# Patient Record
Sex: FEMALE | Race: WHITE | NOT HISPANIC OR LATINO | Employment: FULL TIME | URBAN - METROPOLITAN AREA
[De-identification: names, ages, dates, MRNs, and addresses within clinical notes are randomized per-mention and may not be internally consistent; named-entity substitution may affect disease eponyms.]

---

## 2020-03-04 ENCOUNTER — TRANSCRIBE ORDERS (OUTPATIENT)
Dept: LAB | Facility: HOSPITAL | Age: 30
End: 2020-03-04

## 2020-03-04 ENCOUNTER — HOSPITAL ENCOUNTER (EMERGENCY)
Facility: HOSPITAL | Age: 30
Discharge: HOME | End: 2020-03-04
Attending: EMERGENCY MEDICINE | Admitting: EMERGENCY MEDICINE
Payer: COMMERCIAL

## 2020-03-04 VITALS
HEIGHT: 65 IN | SYSTOLIC BLOOD PRESSURE: 125 MMHG | TEMPERATURE: 98.1 F | BODY MASS INDEX: 31.32 KG/M2 | HEART RATE: 110 BPM | RESPIRATION RATE: 18 BRPM | DIASTOLIC BLOOD PRESSURE: 71 MMHG | WEIGHT: 188 LBS | OXYGEN SATURATION: 98 %

## 2020-03-04 DIAGNOSIS — O20.0 THREATENED MISCARRIAGE: Primary | ICD-10-CM

## 2020-03-04 LAB
ABO GROUP BLD: NORMAL
ABO GROUP BLD: NORMAL
RH BLD: POSITIVE
RH BLD: POSITIVE

## 2020-03-04 PROCEDURE — 99284 EMERGENCY DEPT VISIT MOD MDM: CPT | Performed by: EMERGENCY MEDICINE

## 2020-03-04 PROCEDURE — 86901 BLOOD TYPING SEROLOGIC RH(D): CPT | Performed by: EMERGENCY MEDICINE

## 2020-03-04 PROCEDURE — 36415 COLL VENOUS BLD VENIPUNCTURE: CPT | Performed by: EMERGENCY MEDICINE

## 2020-03-04 PROCEDURE — 86900 BLOOD TYPING SEROLOGIC ABO: CPT | Performed by: EMERGENCY MEDICINE

## 2020-03-04 PROCEDURE — 76815 OB US LIMITED FETUS(S): CPT | Performed by: EMERGENCY MEDICINE

## 2020-03-04 PROCEDURE — 99284 EMERGENCY DEPT VISIT MOD MDM: CPT

## 2020-03-04 NOTE — ED PROVIDER NOTES
History  Chief Complaint   Patient presents with    Pregnancy Problem     14 weeks pregnant  Has small amount of reddish discharge  this began this morning  Patient has "a little cramping on my right side"      Patient is a 49-year-old female  She is a  1 para 0  EGA of 14 weeks  She has had a prior ultrasound verifying an IUP  She presents to the emergency room with a 1 day history of spotting  No passage of products of conception  Small amount of pain on the right side of the lower abdomen  No fever or chills  No nausea or vomiting  Symptoms are moderate in intensity without aggravating or relieving factors  Prior to Admission Medications   Prescriptions Last Dose Informant Patient Reported? Taking? Prenatal MV-Min-Fe Fum-FA-DHA (PRENATAL 1 PO)   Yes Yes   Sig: Take by mouth   Progesterone Micronized (PROGESTERONE PO)   Yes Yes   Sig: Take 200 mg by mouth daily      Facility-Administered Medications: None       Past Medical History:   Diagnosis Date    Anxiety     Depression        Past Surgical History:   Procedure Laterality Date    KNEE SURGERY         History reviewed  No pertinent family history  I have reviewed and agree with the history as documented  E-Cigarette/Vaping    E-Cigarette Use Never User      E-Cigarette/Vaping Substances     Social History     Tobacco Use    Smoking status: Never Smoker    Smokeless tobacco: Never Used   Substance Use Topics    Alcohol use: Not Currently    Drug use: Never       Review of Systems   Constitutional: Negative for chills and fever  HENT: Negative for rhinorrhea and sore throat  Eyes: Negative for pain, redness and visual disturbance  Respiratory: Negative for cough and shortness of breath  Cardiovascular: Negative for chest pain and leg swelling  Gastrointestinal: Positive for abdominal pain  Negative for diarrhea and vomiting  Endocrine: Negative for polydipsia and polyuria     Genitourinary: Positive for vaginal bleeding  Negative for dysuria, frequency, hematuria and vaginal discharge  Musculoskeletal: Negative for back pain and neck pain  Skin: Negative for rash and wound  Allergic/Immunologic: Negative for immunocompromised state  Neurological: Negative for weakness, numbness and headaches  Hematological: Does not bruise/bleed easily  Psychiatric/Behavioral: Negative for hallucinations and suicidal ideas  All other systems reviewed and are negative  Physical Exam  Physical Exam   Constitutional: She is oriented to person, place, and time  She appears well-developed and well-nourished  No distress  HENT:   Head: Normocephalic and atraumatic  Mouth/Throat: Oropharynx is clear and moist    Eyes: Conjunctivae are normal  Right eye exhibits no discharge  Left eye exhibits no discharge  No scleral icterus  Neck: Normal range of motion  Neck supple  Cardiovascular: Normal rate, regular rhythm, normal heart sounds and intact distal pulses  Exam reveals no gallop and no friction rub  No murmur heard  Pulmonary/Chest: Effort normal and breath sounds normal  No stridor  No respiratory distress  She has no wheezes  She has no rales  Abdominal: Soft  Bowel sounds are normal  She exhibits no distension  There is no tenderness  There is no rebound and no guarding  Musculoskeletal: Normal range of motion  She exhibits no edema, tenderness or deformity  No CVA tenderness  No calf tenderness/palpable cords  Neurological: She is alert and oriented to person, place, and time  She has normal strength  No sensory deficit  GCS eye subscore is 4  GCS verbal subscore is 5  GCS motor subscore is 6  Skin: Skin is warm and dry  No rash noted  Psychiatric: She has a normal mood and affect  Her behavior is normal    Vitals reviewed        Vital Signs  ED Triage Vitals [03/04/20 1504]   Temperature Pulse Respirations Blood Pressure SpO2   98 1 °F (36 7 °C) (!) 120 22 129/73 99 %      Temp Source Heart Rate Source Patient Position - Orthostatic VS BP Location FiO2 (%)   Temporal Monitor Sitting Left arm --      Pain Score       3           Vitals:    03/04/20 1504   BP: 129/73   Pulse: (!) 120   Patient Position - Orthostatic VS: Sitting         Visual Acuity      ED Medications  Medications - No data to display    Diagnostic Studies  Results Reviewed     None                 No orders to display              Procedures  General Procedure  Date/Time: 3/4/2020 4:26 PM  Performed by: Ludmila Almeida MD  Authorized by: Ludmila Almeida MD     Patient location:  ED  Post-procedure details:     Patient tolerance of procedure: Tolerated well, no immediate complications  Comments:      Bedside ultrasound shows a live IUP with good fetal heart tones and good fetal movement  ED Course                               MDM  Number of Diagnoses or Management Options  Diagnosis management comments: Ultrasound showed live IUP  Patient is Rh positive  Appropriate for discharge and outpatient management  Threatened AB  Amount and/or Complexity of Data Reviewed  Clinical lab tests: ordered and reviewed          Disposition  Final diagnoses:   Threatened miscarriage     Time reflects when diagnosis was documented in both MDM as applicable and the Disposition within this note     Time User Action Codes Description Comment    3/4/2020  5:35 PM Young Levy Add [O20 0] Threatened miscarriage       ED Disposition     ED Disposition Condition Date/Time Comment    Discharge Stable Wed Mar 4, 2020  5:35 PM Sincere Naidu discharge to home/self care  Follow-up Information     Follow up With Specialties Details Why Contact Info    Follow-up with your OBGYN next week              Patient's Medications   Discharge Prescriptions    No medications on file     No discharge procedures on file      PDMP Review     None          ED Provider  Electronically Signed by           Ludmila Almeida MD  03/04/20 7892

## 2021-06-03 ENCOUNTER — OFFICE VISIT (OUTPATIENT)
Dept: URGENT CARE | Facility: CLINIC | Age: 31
End: 2021-06-03
Payer: COMMERCIAL

## 2021-06-03 VITALS — OXYGEN SATURATION: 97 % | RESPIRATION RATE: 18 BRPM | HEART RATE: 95 BPM | TEMPERATURE: 97.8 F

## 2021-06-03 DIAGNOSIS — J01.81 OTHER ACUTE RECURRENT SINUSITIS: Primary | ICD-10-CM

## 2021-06-03 DIAGNOSIS — R68.89 FLU-LIKE SYMPTOMS: ICD-10-CM

## 2021-06-03 PROCEDURE — U0003 INFECTIOUS AGENT DETECTION BY NUCLEIC ACID (DNA OR RNA); SEVERE ACUTE RESPIRATORY SYNDROME CORONAVIRUS 2 (SARS-COV-2) (CORONAVIRUS DISEASE [COVID-19]), AMPLIFIED PROBE TECHNIQUE, MAKING USE OF HIGH THROUGHPUT TECHNOLOGIES AS DESCRIBED BY CMS-2020-01-R: HCPCS | Performed by: NURSE PRACTITIONER

## 2021-06-03 PROCEDURE — U0005 INFEC AGEN DETEC AMPLI PROBE: HCPCS | Performed by: NURSE PRACTITIONER

## 2021-06-03 PROCEDURE — 99213 OFFICE O/P EST LOW 20 MIN: CPT | Performed by: NURSE PRACTITIONER

## 2021-06-03 RX ORDER — DOXYCYCLINE 100 MG/1
100 TABLET ORAL 2 TIMES DAILY
Qty: 14 TABLET | Refills: 0 | Status: SHIPPED | OUTPATIENT
Start: 2021-06-03 | End: 2021-06-10

## 2021-06-03 NOTE — PATIENT INSTRUCTIONS
Take doxycycline as prescribed  Continue with OTC counter treatment  Follow up with your PCP  Go to the ED if symptoms worsen  No work until covid results are back  [facility]  Paper prescription and work not to patient due to computers being down    Sinusitis   WHAT YOU NEED TO KNOW:   Sinusitis is inflammation or infection of your sinuses  It is most often caused by a virus  Acute sinusitis may last up to 12 weeks  Chronic sinusitis lasts longer than 12 weeks  Recurrent sinusitis means you have 4 or more times in 1 year  DISCHARGE INSTRUCTIONS:   Return to the emergency department if:   · Your eye and eyelid are red, swollen, and painful  · You cannot open your eye  · You have vision changes, such as double vision  · Your eyeball bulges out or you cannot move your eye  · You are more sleepy than normal, or you notice changes in your ability to think, move, or talk  · You have a stiff neck, a fever, or a bad headache  · You have swelling of your forehead or scalp  Contact your healthcare provider if:   · Your symptoms do not improve after 3 days  · Your symptoms do not go away after 10 days  · You have nausea and are vomiting  · Your nose is bleeding  · You have questions or concerns about your condition or care  Medicines: Your symptoms may go away on their own  Your healthcare provider may recommend watchful waiting for up to 10 days before starting antibiotics  You may  need any of the following:  · Acetaminophen  decreases pain and fever  It is available without a doctor's order  Ask how much to take and how often to take it  Follow directions  Read the labels of all other medicines you are using to see if they also contain acetaminophen, or ask your doctor or pharmacist  Acetaminophen can cause liver damage if not taken correctly  Do not use more than 4 grams (4,000 milligrams) total of acetaminophen in one day       · NSAIDs , such as ibuprofen, help decrease swelling, pain, and fever  This medicine is available with or without a doctor's order  NSAIDs can cause stomach bleeding or kidney problems in certain people  If you take blood thinner medicine, always ask your healthcare provider if NSAIDs are safe for you  Always read the medicine label and follow directions  · Nasal steroid sprays  may help decrease inflammation in your nose and sinuses  · Decongestants  help reduce swelling and drain mucus in the nose and sinuses  They may help you breathe easier  · Antihistamines  help dry mucus in the nose and relieve sneezing  · Antibiotics  help treat or prevent a bacterial infection  · Take your medicine as directed  Contact your healthcare provider if you think your medicine is not helping or if you have side effects  Tell him or her if you are allergic to any medicine  Keep a list of the medicines, vitamins, and herbs you take  Include the amounts, and when and why you take them  Bring the list or the pill bottles to follow-up visits  Carry your medicine list with you in case of an emergency  Self-care:   · Rinse your sinuses  Use a sinus rinse device to rinse your nasal passages with a saline (salt water) solution or distilled water  Do not use tap water  This will help thin the mucus in your nose and rinse away pollen and dirt  It will also help reduce swelling so you can breathe normally  Ask your healthcare provider how often to do this  · Breathe in steam   Heat a bowl of water until you see steam  Lean over the bowl and make a tent over your head with a large towel  Breathe deeply for about 20 minutes  Be careful not to get too close to the steam or burn yourself  Do this 3 times a day  You can also breathe deeply when you take a hot shower  · Sleep with your head elevated  Place an extra pillow under your head before you go to sleep to help your sinuses drain  · Drink liquids as directed    Ask your healthcare provider how much liquid to drink each day and which liquids are best for you  Liquids will thin the mucus in your nose and help it drain  Avoid drinks that contain alcohol or caffeine  · Do not smoke, and avoid secondhand smoke  Nicotine and other chemicals in cigarettes and cigars can make your symptoms worse  Ask your healthcare provider for information if you currently smoke and need help to quit  E-cigarettes or smokeless tobacco still contain nicotine  Talk to your healthcare provider before you use these products  Prevent the spread of germs that cause sinusitis:  Wash your hands often with soap and water  Wash your hands after you use the bathroom, change a child's diaper, or sneeze  Wash your hands before you prepare or eat food  Follow up with your healthcare provider as directed: You may be referred to an ear, nose, and throat specialist  Write down your questions so you remember to ask them during your visits  © Copyright [address] Information is for End User's use only and may not be sold, redistributed or otherwise used for commercial purposes  All illustrations and images included in CareNotes® are the copyrighted property of A D A M , Inc  or [facility] Norah Moreno   The above information is an  only  It is not intended as medical advice for individual conditions or treatments  Talk to your doctor, nurse or pharmacist before following any medical regimen to see if it is safe and effective for you

## 2021-06-03 NOTE — PROGRESS NOTES
[facility]        NAME: Jordan Gillespie is a 32 y o  female  : 1990    MRN: [id number]  DATE: Tara 3, 2021  TIME: 10:25 AM    Assessment and Plan   Other acute recurrent sinusitis [J01 81]  1  Other acute recurrent sinusitis  doxycycline (ADOXA) 100 MG tablet   2  Flu-like symptoms  [facility]Covid-19[facility]         Patient Instructions       Follow up with PCP in 3-5 days  Proceed to  ER if symptoms worsen  Chief Complaint   No chief complaint on file  History of Present Illness       This is a 32year old female who states has been having severe allergy symptoms, with developing sinusitis since last Wednesday  States got J&J vaccine on Saturday and symptoms worsened  Pt states she has had a temp of 99 to 101 over the last few days  She states she was taking tylenol and ibuprofen however fever returned  She states she has sinus pressure, + PND mild cough  Denies n/v/d  She states she was sent her by her employer to have covid testing done as well  LMP now - IUD       Review of Systems   Review of Systems   Constitutional: Positive for chills and fever  HENT: Positive for congestion, postnasal drip, sinus pressure and sinus pain  Eyes: Negative  Respiratory: Positive for cough  Cardiovascular: Negative  Gastrointestinal: Negative  Endocrine: Negative  Genitourinary: Negative  Musculoskeletal: Negative  Skin: Negative  Allergic/Immunologic: Negative  Neurological: Negative  Hematological: Negative  Psychiatric/Behavioral: Negative            Current Medications       Current Outpatient Medications:     doxycycline (ADOXA) 100 MG tablet, Take 1 tablet (100 mg total) by mouth 2 (two) times a day for 7 days, Disp: 14 tablet, Rfl: 0    Prenatal MV-Min-Fe Fum-FA-DHA (PRENATAL 1 PO), Take by mouth, Disp: , Rfl:     Progesterone Micronized (PROGESTERONE PO), Take 200 mg by mouth daily, Disp: , Rfl:     Current Allergies     Allergies as of 06/03/2021 - Reviewed 06/03/2021   Allergen Reaction Noted    Azithromycin  03/04/2020    Iv dye [iodinated diagnostic agents]  03/04/2020    Latex  03/04/2020    Mucinex chest congestion child [guaifenesin]  03/04/2020            The following portions of the patient's history were reviewed and updated as appropriate: allergies, current medications, past family history, past medical history, past social history, past surgical history and problem list      Past Medical History:   Diagnosis Date    Anxiety     Depression        Past Surgical History:   Procedure Laterality Date    KNEE SURGERY         History reviewed  No pertinent family history  Medications have been verified  Objective   Pulse 95   Temp 97 8 °F (36 6 °C)   Resp 18   SpO2 97%   No LMP recorded  Patient is pregnant  Physical Exam     Physical Exam  Vitals signs and nursing note reviewed  Constitutional:       General: She is not in acute distress  Appearance: She is normal weight  She is not ill-appearing, toxic-appearing or diaphoretic  HENT:      Head: Normocephalic and atraumatic  Right Ear: Tympanic membrane, ear canal and external ear normal       Left Ear: Tympanic membrane, ear canal and external ear normal       Nose: Congestion present  Mouth/Throat:      Mouth: Mucous membranes are moist       Pharynx: Posterior oropharyngeal erythema present  Eyes:      Extraocular Movements: Extraocular movements intact  Pupils: Pupils are equal, round, and reactive to light  Neck:      Musculoskeletal: Normal range of motion  Cardiovascular:      Rate and Rhythm: Normal rate and regular rhythm  Pulses: Normal pulses  Heart sounds: Normal heart sounds  Pulmonary:      Effort: Pulmonary effort is normal       Breath sounds: Normal breath sounds  Abdominal:      General: There is no distension  Palpations: Abdomen is soft  Tenderness:  There is no abdominal tenderness  Musculoskeletal: Normal range of motion  Skin:     General: Skin is warm and dry  Capillary Refill: Capillary refill takes less than 2 seconds  Neurological:      General: No focal deficit present  Mental Status: She is alert and oriented to person, place, and time  Psychiatric:         Mood and Affect: Mood normal          Behavior: Behavior normal          Thought Content:  Thought content normal          Judgment: Judgment normal

## 2021-06-04 LAB — SARS-COV-2 RNA RESP QL NAA+PROBE: NEGATIVE

## 2022-03-29 ENCOUNTER — OFFICE VISIT (OUTPATIENT)
Dept: URGENT CARE | Facility: CLINIC | Age: 32
End: 2022-03-29
Payer: COMMERCIAL

## 2022-03-29 VITALS
WEIGHT: 137.8 LBS | SYSTOLIC BLOOD PRESSURE: 119 MMHG | BODY MASS INDEX: 22.96 KG/M2 | TEMPERATURE: 97.3 F | OXYGEN SATURATION: 99 % | HEART RATE: 115 BPM | HEIGHT: 65 IN | RESPIRATION RATE: 16 BRPM | DIASTOLIC BLOOD PRESSURE: 85 MMHG

## 2022-03-29 DIAGNOSIS — H61.21 IMPACTED CERUMEN OF RIGHT EAR: ICD-10-CM

## 2022-03-29 DIAGNOSIS — H60.391 OTHER INFECTIVE ACUTE OTITIS EXTERNA OF RIGHT EAR: Primary | ICD-10-CM

## 2022-03-29 PROCEDURE — 69210 REMOVE IMPACTED EAR WAX UNI: CPT | Performed by: FAMILY MEDICINE

## 2022-03-29 PROCEDURE — 99213 OFFICE O/P EST LOW 20 MIN: CPT | Performed by: FAMILY MEDICINE

## 2022-03-29 RX ORDER — OFLOXACIN 3 MG/ML
5 SOLUTION AURICULAR (OTIC) DAILY
Qty: 5 ML | Refills: 0 | Status: SHIPPED | OUTPATIENT
Start: 2022-03-29 | End: 2022-04-05

## 2022-03-29 RX ORDER — ACETAMINOPHEN 325 MG/1
650 TABLET ORAL ONCE
Status: COMPLETED | OUTPATIENT
Start: 2022-03-29 | End: 2022-03-29

## 2022-03-29 RX ORDER — NEOMYCIN SULFATE, POLYMYXIN B SULFATE AND HYDROCORTISONE 10; 3.5; 1 MG/ML; MG/ML; [USP'U]/ML
4 SUSPENSION/ DROPS AURICULAR (OTIC) EVERY 8 HOURS SCHEDULED
Qty: 10 ML | Refills: 0 | Status: CANCELLED | OUTPATIENT
Start: 2022-03-29 | End: 2022-04-01

## 2022-03-29 RX ORDER — AMOXICILLIN AND CLAVULANATE POTASSIUM 875; 125 MG/1; MG/1
1 TABLET, FILM COATED ORAL EVERY 12 HOURS SCHEDULED
Qty: 10 TABLET | Refills: 0 | Status: SHIPPED | OUTPATIENT
Start: 2022-03-29 | End: 2022-04-03

## 2022-03-29 RX ORDER — NAPROXEN 500 MG/1
500 TABLET ORAL 2 TIMES DAILY WITH MEALS
Qty: 14 TABLET | Refills: 0 | Status: SHIPPED | OUTPATIENT
Start: 2022-03-29 | End: 2022-06-06 | Stop reason: ALTCHOICE

## 2022-03-29 RX ADMIN — ACETAMINOPHEN 650 MG: 325 TABLET ORAL at 10:21

## 2022-03-29 NOTE — PROGRESS NOTES
[facility]        NAME: Renee Mccormack is a 32 y o  female  : 1990    MRN: [id number]  DATE: 2022  TIME: 9:54 AM    Assessment and Plan   Other infective acute otitis externa of right ear [H60 391]  1  Other infective acute otitis externa of right ear  amoxicillin-clavulanate (AUGMENTIN) 875-125 mg per tablet    naproxen (Naprosyn) 500 mg tablet    ofloxacin (FLOXIN) 0 3 % otic solution    acetaminophen (TYLENOL) tablet 650 mg   2  Impacted cerumen of right ear  Ear cerumen removal     Ear cerumen removal    Date/Time: 3/29/2022 9:50 AM  Performed by: Britta Gannon MD  Authorized by: Britta Gannon MD   Universal Protocol:  Consent: Verbal consent obtained  Risks and benefits: risks, benefits and alternatives were discussed  Consent given by: patient  Time out: Immediately prior to procedure a "time out" was called to verify the correct patient, procedure, equipment, support staff and site/side marked as required  Patient understanding: patient states understanding of the procedure being performed  Patient consent: the patient's understanding of the procedure matches consent given  Required items: required blood products, implants, devices, and special equipment available  Patient identity confirmed: verbally with patient      Patient location:  Clinic  Procedure details:     Location:  R ear    Procedure type: irrigation with instrumentation      Instrumentation: curette      Approach:  External  Post-procedure details:     Hearing quality:  Diminished    Patient tolerance of procedure: Tolerated with difficulty  Comments:      Partial removal of cerumen  Procedure aborted when the visible portion of the TM appeared perforated  Ear canal appears inflamed  Will treat with Ofloxacin otic drops which are not ototoxic  Augmentin for possible deeper spread of infection into the middle ear  Naproxen for pain control  Tylenol given in the office due to pain  Patient Instructions     Follow up with PCP in 3-5 days  Proceed to  ER if symptoms worsen  Chief Complaint     Chief Complaint   Patient presents with    Ear Problem     Pt reports right ear pain and not being able to hear anything         History of Present Illness       25-year-old healthy female presents today due to 1 week of right otalgia and hearing loss associated with mild dizziness  Denies any obvious fevers, chills, headaches, chest pain, dyspnea or abdominal symptoms  Denies any trauma to ear  Reports that the pain is now radiating to right side of her face and neck  No worsening or relieving factors  Review of Systems   Review of Systems   Constitutional: Negative for chills and fever  HENT: Positive for ear pain and hearing loss  Negative for congestion, rhinorrhea and sore throat  Respiratory: Negative for cough, shortness of breath and wheezing  Cardiovascular: Negative for chest pain  Gastrointestinal: Negative for abdominal pain and nausea  Musculoskeletal: Negative for arthralgias  Skin: Negative for rash  Neurological: Positive for dizziness (mild)  Negative for headaches           Current Medications       Current Outpatient Medications:     amoxicillin-clavulanate (AUGMENTIN) 875-125 mg per tablet, Take 1 tablet by mouth every 12 (twelve) hours for 5 days, Disp: 10 tablet, Rfl: 0    naproxen (Naprosyn) 500 mg tablet, Take 1 tablet (500 mg total) by mouth 2 (two) times a day with meals for 7 days, Disp: 14 tablet, Rfl: 0    ofloxacin (FLOXIN) 0 3 % otic solution, Administer 5 drops to the right ear daily for 7 days, Disp: 5 mL, Rfl: 0    Prenatal MV-Min-Fe Fum-FA-DHA (PRENATAL 1 PO), Take by mouth (Patient not taking: Reported on 3/29/2022 ), Disp: , Rfl:     Progesterone Micronized (PROGESTERONE PO), Take 200 mg by mouth daily (Patient not taking: Reported on 3/29/2022 ), Disp: , Rfl:     Current Facility-Administered Medications:     acetaminophen (TYLENOL) tablet 650 mg, 650 mg, Oral, Once, Yesenia Blackwell MD    Current Allergies     Allergies as of 03/29/2022 - Reviewed 03/29/2022   Allergen Reaction Noted    Azithromycin  03/04/2020    Iv dye [iodinated diagnostic agents]  03/04/2020    Latex  03/04/2020    Mucinex chest congestion child [guaifenesin]  03/04/2020            The following portions of the patient's history were reviewed and updated as appropriate: allergies, current medications, past family history, past medical history, past social history, past surgical history and problem list      Past Medical History:   Diagnosis Date    Anxiety     Depression        Past Surgical History:   Procedure Laterality Date    KNEE SURGERY         History reviewed  No pertinent family history  Medications have been verified  Objective   /85   Pulse (!) 115   Temp (!) 97 3 °F (36 3 °C)   Resp 16   Ht 5' 5" (1 651 m)   Wt 62 5 kg (137 lb 12 8 oz)   SpO2 99%   Breastfeeding Unknown   BMI 22 93 kg/m²   No LMP recorded  Patient has had an implant  Physical Exam     Physical Exam  Vitals and nursing note reviewed  Constitutional:       General: She is in acute distress (right otalgia)  Appearance: Normal appearance  She is normal weight  She is not ill-appearing, toxic-appearing or diaphoretic  HENT:      Head: Normocephalic and atraumatic  Right Ear: External ear normal  There is impacted cerumen  Left Ear: Tympanic membrane, ear canal and external ear normal  There is no impacted cerumen  Ears:      Comments: Red ear canal   Partial view of TM seems to show a rupture  Eyes:      General:         Right eye: No discharge  Left eye: No discharge  Conjunctiva/sclera: Conjunctivae normal    Pulmonary:      Effort: Pulmonary effort is normal    Skin:     General: Skin is warm  Findings: No erythema  Neurological:      General: No focal deficit present        Mental Status: She is alert and oriented to person, place, and time  Psychiatric:         Mood and Affect: Mood normal          Behavior: Behavior normal          Thought Content:  Thought content normal          Judgment: Judgment normal

## 2022-06-06 ENCOUNTER — OFFICE VISIT (OUTPATIENT)
Dept: URGENT CARE | Facility: CLINIC | Age: 32
End: 2022-06-06
Payer: COMMERCIAL

## 2022-06-06 VITALS — RESPIRATION RATE: 18 BRPM | OXYGEN SATURATION: 98 % | TEMPERATURE: 98.4 F | HEART RATE: 102 BPM

## 2022-06-06 DIAGNOSIS — J20.9 ACUTE BRONCHITIS, UNSPECIFIED ORGANISM: Primary | ICD-10-CM

## 2022-06-06 PROCEDURE — 99213 OFFICE O/P EST LOW 20 MIN: CPT | Performed by: NURSE PRACTITIONER

## 2022-06-06 RX ORDER — AMOXICILLIN AND CLAVULANATE POTASSIUM 875; 125 MG/1; MG/1
1 TABLET, FILM COATED ORAL EVERY 12 HOURS SCHEDULED
Qty: 20 TABLET | Refills: 0 | Status: SHIPPED | OUTPATIENT
Start: 2022-06-06 | End: 2022-06-16

## 2022-06-06 RX ORDER — LEVONORGESTREL 52 MG/1
1 INTRAUTERINE DEVICE INTRAUTERINE CONTINUOUS
COMMUNITY

## 2022-06-06 NOTE — PATIENT INSTRUCTIONS
Take the Augmentin as ordered until completed  Eat yogurt or take a probiotic to restore good bacteria to your gut; this helps prevent stomach irritation/diarrhea while on an antibiotic  Acute Bronchitis   AMBULATORY CARE:   Acute bronchitis  is swelling and irritation in your lungs  It is usually caused by a virus and most often happens in the winter  Bronchitis may also be caused by bacteria or by a chemical irritant, such as smoke  Common symptoms include the following:   Cough that lasts up to 3 weeks, stuffy nose    Hoarseness, sore throat    A fever and chills    Feeling more tired than usual, and body aches    Wheezing or pain when you breathe or cough    Seek care immediately if:   You cough up blood  Your lips or fingernails turn blue  You feel like you are not getting enough air when you breathe  Call your doctor if:   Your symptoms do not go away or get worse, even after treatment  Your cough does not get better within 4 weeks  You have questions or concerns about your condition or care  Medicines: You may  need any of the following:  Cough suppressants  decrease your urge to cough  Decongestants  help loosen mucus in your lungs and make it easier to cough up  This can help you breathe easier  Inhalers  may be given  Your healthcare provider may give you one or more inhalers to help you breathe easier and cough less  An inhaler gives your medicine to open your airways  Ask your healthcare provider to show you how to use your inhaler correctly  Antibiotics  may be given for up to 5 days if your bronchitis is caused by bacteria  Acetaminophen  decreases pain and fever  It is available without a doctor's order  Ask how much to take and how often to take it  Follow directions  Read the labels of all other medicines you are using to see if they also contain acetaminophen, or ask your doctor or pharmacist  Acetaminophen can cause liver damage if not taken correctly   Do not use more than 4 grams (4,000 milligrams) total of acetaminophen in one day  NSAIDs  help decrease swelling and pain or fever  This medicine is available with or without a doctor's order  NSAIDs can cause stomach bleeding or kidney problems in certain people  If you take blood thinner medicine, always ask your healthcare provider if NSAIDs are safe for you  Always read the medicine label and follow directions  Take your medicine as directed  Contact your healthcare provider if you think your medicine is not helping or if you have side effects  Tell him of her if you are allergic to any medicine  Keep a list of the medicines, vitamins, and herbs you take  Include the amounts, and when and why you take them  Bring the list or the pill bottles to follow-up visits  Carry your medicine list with you in case of an emergency  Self-care:   Drink liquids as directed  You may need to drink more liquids than usual to stay hydrated  Ask how much liquid to drink each day and which liquids are best for you  Use a cool mist humidifier  to increase air moisture in your home  This may make it easier for you to breathe and help decrease your cough  Get more rest   Rest helps your body to heal  Slowly start to do more each day  Rest when you feel it is needed  Avoid irritants in the air  Avoid chemicals, fumes, and dust  Wear a face mask if you must work around dust or fumes  Stay inside on days when air pollution levels are high  If you have allergies, stay inside when pollen counts are high  Do not use aerosol products, such as spray-on deodorant, bug spray, and hair spray  Do not smoke or be around others who are smoking  Nicotine and other chemicals in cigarettes and cigars can cause lung damage  Ask your healthcare provider for information if you currently smoke and need help to quit  E-cigarettes or smokeless tobacco still contain nicotine   Talk to your healthcare provider before you use these products  Prevent acute bronchitis:       Ask about vaccines you may need  Get a flu vaccine each year as soon as recommended, usually in September or October  Ask your healthcare provider if you should also get a pneumonia or COVID-19 vaccine  Your healthcare provider can tell you if you should also get other vaccines, and when to get them  Prevent the spread of germs  You can decrease your risk for acute bronchitis and other illnesses by doing the following:    Wash your hands often with soap and water  Carry germ-killing hand lotion or gel with you  You can use the lotion or gel to clean your hands when soap and water are not available  Do not touch your eyes, nose, or mouth unless you have washed your hands first     Always cover your mouth when you cough to prevent the spread of germs  It is best to cough into a tissue or your shirt sleeve instead of into your hand  Ask those around you to cover their mouths when they cough  Try to avoid people who have a cold or the flu  If you are sick, stay away from others as much as possible  Follow up with your doctor as directed:  Write down questions you have so you will remember to ask them during your follow-up visits  © Copyright [employer] 2022 Information is for End User's use only and may not be sold, redistributed or otherwise used for commercial purposes  All illustrations and images included in CareNotes® are the copyrighted property of A D A M [employer]  or Daksha Mccullough  The above information is an  only  It is not intended as medical advice for individual conditions or treatments  Talk to your doctor, nurse or pharmacist before following any medical regimen to see if it is safe and effective for you

## 2022-06-06 NOTE — PROGRESS NOTES
[facility]        NAME: Sincere Naidu is a 28 y o  female  : 1990    MRN: [id number]  DATE: 2022  TIME: 4:25 PM      Assessment and Plan     Acute bronchitis, unspecified organism [J20 9]  1  Acute bronchitis, unspecified organism  amoxicillin-clavulanate (AUGMENTIN) 875-125 mg per tablet         Patient Instructions     Patient Instructions     Take the Augmentin as ordered until completed  Eat yogurt or take a probiotic to restore good bacteria to your gut; this helps prevent stomach irritation/diarrhea while on an antibiotic  Acute Bronchitis   AMBULATORY CARE:   Acute bronchitis  is swelling and irritation in your lungs  It is usually caused by a virus and most often happens in the winter  Bronchitis may also be caused by bacteria or by a chemical irritant, such as smoke  Common symptoms include the following:   · Cough that lasts up to 3 weeks, stuffy nose    · Hoarseness, sore throat    · A fever and chills    · Feeling more tired than usual, and body aches    · Wheezing or pain when you breathe or cough    Seek care immediately if:   · You cough up blood  · Your lips or fingernails turn blue  · You feel like you are not getting enough air when you breathe  Call your doctor if:   · Your symptoms do not go away or get worse, even after treatment  · Your cough does not get better within 4 weeks  · You have questions or concerns about your condition or care  Medicines: You may  need any of the following:  · Cough suppressants  decrease your urge to cough  · Decongestants  help loosen mucus in your lungs and make it easier to cough up  This can help you breathe easier  · Inhalers  may be given  Your healthcare provider may give you one or more inhalers to help you breathe easier and cough less  An inhaler gives your medicine to open your airways  Ask your healthcare provider to show you how to use your inhaler correctly           · Antibiotics  may be given for up to 5 days if your bronchitis is caused by bacteria  · Acetaminophen  decreases pain and fever  It is available without a doctor's order  Ask how much to take and how often to take it  Follow directions  Read the labels of all other medicines you are using to see if they also contain acetaminophen, or ask your doctor or pharmacist  Acetaminophen can cause liver damage if not taken correctly  Do not use more than 4 grams (4,000 milligrams) total of acetaminophen in one day  · NSAIDs  help decrease swelling and pain or fever  This medicine is available with or without a doctor's order  NSAIDs can cause stomach bleeding or kidney problems in certain people  If you take blood thinner medicine, always ask your healthcare provider if NSAIDs are safe for you  Always read the medicine label and follow directions  · Take your medicine as directed  Contact your healthcare provider if you think your medicine is not helping or if you have side effects  Tell him of her if you are allergic to any medicine  Keep a list of the medicines, vitamins, and herbs you take  Include the amounts, and when and why you take them  Bring the list or the pill bottles to follow-up visits  Carry your medicine list with you in case of an emergency  Self-care:   · Drink liquids as directed  You may need to drink more liquids than usual to stay hydrated  Ask how much liquid to drink each day and which liquids are best for you  · Use a cool mist humidifier  to increase air moisture in your home  This may make it easier for you to breathe and help decrease your cough  · Get more rest   Rest helps your body to heal  Slowly start to do more each day  Rest when you feel it is needed  · Avoid irritants in the air  Avoid chemicals, fumes, and dust  Wear a face mask if you must work around dust or fumes  Stay inside on days when air pollution levels are high  If you have allergies, stay inside when pollen counts are high   Do not use aerosol products, such as spray-on deodorant, bug spray, and hair spray  · Do not smoke or be around others who are smoking  Nicotine and other chemicals in cigarettes and cigars can cause lung damage  Ask your healthcare provider for information if you currently smoke and need help to quit  E-cigarettes or smokeless tobacco still contain nicotine  Talk to your healthcare provider before you use these products  Prevent acute bronchitis:       1  Ask about vaccines you may need  Get a flu vaccine each year as soon as recommended, usually in September or October  Ask your healthcare provider if you should also get a pneumonia or COVID-19 vaccine  Your healthcare provider can tell you if you should also get other vaccines, and when to get them  2  Prevent the spread of germs  You can decrease your risk for acute bronchitis and other illnesses by doing the following:    ? Wash your hands often with soap and water  Carry germ-killing hand lotion or gel with you  You can use the lotion or gel to clean your hands when soap and water are not available  ? Do not touch your eyes, nose, or mouth unless you have washed your hands first     ? Always cover your mouth when you cough to prevent the spread of germs  It is best to cough into a tissue or your shirt sleeve instead of into your hand  Ask those around you to cover their mouths when they cough  ? Try to avoid people who have a cold or the flu  If you are sick, stay away from others as much as possible  Follow up with your doctor as directed:  Write down questions you have so you will remember to ask them during your follow-up visits  © Copyright [address] 2022 Information is for End User's use only and may not be sold, redistributed or otherwise used for commercial purposes   All illustrations and images included in CareNotes® are the copyrighted property of [employer]  or Daksha Mccullough  The above information is an  only  It is not intended as medical advice for individual conditions or treatments  Talk to your doctor, nurse or pharmacist before following any medical regimen to see if it is safe and effective for you  Follow up with PCP in 3-5 days  Proceed to  ER if symptoms worsen  Chief Complaint     Chief Complaint   Patient presents with    Cough     Cough and shortness of breath for three days  History of Present Illness     Patient presents along with her daughter  Daughter has been sick for 7 days and is getting worse  Patient had onset of symptoms 3 days ago and has been having worsening SOB and chest tightness  No hx asthma and has never needed an inhaler/nebulizer, but is a current smoker  Has been taking jose enrique seltzer plus cold/cough with slight tempoary improvement  Review of Systems     Review of Systems   HENT: Positive for congestion (mild)  Respiratory: Positive for cough, chest tightness and shortness of breath  All other systems reviewed and are negative          Current Medications       Current Outpatient Medications:     amoxicillin-clavulanate (AUGMENTIN) 875-125 mg per tablet, Take 1 tablet by mouth every 12 (twelve) hours for 10 days, Disp: 20 tablet, Rfl: 0    Fexofenadine-Pseudoephedrine (ALLEGRA-D 24 HOUR PO), Take 1 tablet by mouth in the morning, Disp: , Rfl:     Levonorgestrel (Mirena, 52 MG,) 20 MCG/DAY IUD, 1 Intra Uterine Device by Intrauterine route continuous, Disp: , Rfl:     Current Allergies     Allergies as of 06/06/2022 - Reviewed 06/06/2022   Allergen Reaction Noted    Azithromycin  03/04/2020    Iv dye [iodinated diagnostic agents]  03/04/2020    Latex  03/04/2020    Mucinex chest congestion child [guaifenesin]  03/04/2020              The following portions of the patient's history were reviewed and updated as appropriate: allergies, current medications, past family history, past medical history, past social history, past surgical history and problem list      Past Medical History:   Diagnosis Date    Anxiety     Depression        Past Surgical History:   Procedure Laterality Date    KNEE SURGERY         History reviewed  No pertinent family history  Medications have been verified  Objective     Pulse 102   Temp 98 4 °F (36 9 °C)   Resp 18   SpO2 98%   No LMP recorded  Patient has had an implant  Physical Exam     Physical Exam  Vitals and nursing note reviewed  Constitutional:       General: She is not in acute distress  Appearance: Normal appearance  She is well-developed and well-groomed  She is ill-appearing  She is not toxic-appearing or diaphoretic  HENT:      Head: Normocephalic and atraumatic  Right Ear: Hearing, tympanic membrane, ear canal and external ear normal       Left Ear: Hearing, tympanic membrane, ear canal and external ear normal       Nose: Mucosal edema and congestion (mild) present  No nasal tenderness  Right Sinus: No maxillary sinus tenderness or frontal sinus tenderness  Left Sinus: No maxillary sinus tenderness or frontal sinus tenderness  Mouth/Throat:      Mouth: Mucous membranes are moist       Pharynx: Uvula midline  No oropharyngeal exudate or posterior oropharyngeal erythema  Tonsils: No tonsillar exudate or tonsillar abscesses  Eyes:      Pupils: Pupils are equal, round, and reactive to light  Cardiovascular:      Rate and Rhythm: Normal rate and regular rhythm  No extrasystoles are present  Heart sounds: Normal heart sounds, S1 normal and S2 normal  No murmur heard  No friction rub  No gallop  Pulmonary:      Effort: Pulmonary effort is normal  No tachypnea, bradypnea, accessory muscle usage, prolonged expiration, respiratory distress or retractions  Breath sounds: Normal air entry  No stridor or decreased air movement  Wheezing (faint, mild I&E wheezes scattered throughout; good air movement) present   No decreased breath sounds, rhonchi or rales  Abdominal:      General: Bowel sounds are normal  There is no distension  Palpations: Abdomen is soft  Tenderness: There is no abdominal tenderness  Musculoskeletal:         General: Normal range of motion  Cervical back: Normal range of motion and neck supple  Skin:     General: Skin is warm and dry  Capillary Refill: Capillary refill takes less than 2 seconds  Neurological:      General: No focal deficit present  Mental Status: She is alert and oriented to person, place, and time  Psychiatric:         Mood and Affect: Mood normal          Behavior: Behavior normal  Behavior is cooperative  Thought Content:  Thought content normal          Judgment: Judgment normal

## 2022-12-27 ENCOUNTER — OFFICE VISIT (OUTPATIENT)
Dept: URGENT CARE | Facility: CLINIC | Age: 32
End: 2022-12-27

## 2022-12-27 VITALS
BODY MASS INDEX: 25.83 KG/M2 | TEMPERATURE: 98.4 F | HEIGHT: 65 IN | SYSTOLIC BLOOD PRESSURE: 118 MMHG | WEIGHT: 155 LBS | DIASTOLIC BLOOD PRESSURE: 70 MMHG | OXYGEN SATURATION: 98 % | HEART RATE: 81 BPM | RESPIRATION RATE: 18 BRPM

## 2022-12-27 DIAGNOSIS — H10.32 ACUTE CONJUNCTIVITIS OF LEFT EYE, UNSPECIFIED ACUTE CONJUNCTIVITIS TYPE: Primary | ICD-10-CM

## 2022-12-27 RX ORDER — PROPRANOLOL HYDROCHLORIDE 10 MG/1
10 TABLET ORAL 2 TIMES DAILY
COMMUNITY
Start: 2022-10-12 | End: 2023-01-10

## 2022-12-27 RX ORDER — RIZATRIPTAN BENZOATE 5 MG/1
5 TABLET, ORALLY DISINTEGRATING ORAL
COMMUNITY
Start: 2022-10-12 | End: 2023-10-12

## 2022-12-27 RX ORDER — MOXIFLOXACIN 5 MG/ML
1 SOLUTION/ DROPS OPHTHALMIC 3 TIMES DAILY
Qty: 2 ML | Refills: 0 | Status: SHIPPED | OUTPATIENT
Start: 2022-12-27 | End: 2023-01-03

## 2022-12-27 RX ORDER — MOXIFLOXACIN 5 MG/ML
1 SOLUTION/ DROPS OPHTHALMIC 3 TIMES DAILY
Qty: 2 ML | Refills: 0 | Status: SHIPPED | OUTPATIENT
Start: 2022-12-27 | End: 2022-12-27 | Stop reason: SDUPTHER

## 2022-12-27 NOTE — PROGRESS NOTES
[facility]    NAME: Jordin Zavala is a 28 y o  female  : 1990    MRN: [id number]  DATE: 2022  TIME: 9:56 AM    Assessment and Plan   Acute conjunctivitis of left eye, unspecified acute conjunctivitis type [H10 32]  1  Acute conjunctivitis of left eye, unspecified acute conjunctivitis type  moxifloxacin (VIGAMOX) 0 5 % ophthalmic solution          Patient Instructions   Patient Instructions   Drops as directed  No contacts for a week  Wash hands frequently  Avoid touching the eyes  Chief Complaint     Chief Complaint   Patient presents with   • Eye Pain     Left eye, itchy, draining and draining  Started yesterday       History of Present Illness   19-year-old female here with complaint of redness to her left eye  She does wear contact lenses  She took her contacts out but is still there  Has also had some discharge  Denies any upper respiratory complaints  No fever chills  Review of Systems   Review of Systems   Constitutional: Negative for appetite change, chills and fever  HENT: Negative for congestion, ear discharge, ear pain, facial swelling, postnasal drip, sinus pressure, sneezing and sore throat  Eyes: Positive for discharge and redness  Respiratory: Negative for cough, shortness of breath and wheezing  Neurological: Negative for headaches         Current Medications     Current Outpatient Medications:   •  Fexofenadine-Pseudoephedrine (ALLEGRA-D 24 HOUR PO), Take 1 tablet by mouth in the morning, Disp: , Rfl:   •  Levonorgestrel (Mirena, 52 MG,) 20 MCG/DAY IUD, 1 Intra Uterine Device by Intrauterine route continuous, Disp: , Rfl:   •  moxifloxacin (VIGAMOX) 0 5 % ophthalmic solution, Administer 1 drop to both eyes 3 (three) times a day for 7 days, Disp: 2 mL, Rfl: 0  •  propranolol (INDERAL) 10 mg tablet, Take 10 mg by mouth 2 (two) times a day, Disp: , Rfl:   •  rizatriptan (MAXALT-MLT) 5 mg disintegrating tablet, Take 5 mg by mouth, Disp: , Rfl: Current Allergies     Allergies as of 12/27/2022 - Reviewed 06/06/2022   Allergen Reaction Noted   • Azithromycin  03/04/2020   • Iv dye [iodinated diagnostic agents]  03/04/2020   • Latex  03/04/2020   • Mucinex chest congestion child [guaifenesin]  03/04/2020          The following portions of the patient's history were reviewed and updated as appropriate: allergies, current medications, past family history, past medical history, past social history, past surgical history and problem list    Past Medical History:   Diagnosis Date   • Anxiety    • Depression      Past Surgical History:   Procedure Laterality Date   • KNEE SURGERY       No family history on file  Social History     Socioeconomic History   • Marital status: /Civil Union     Spouse name: Not on file   • Number of children: Not on file   • Years of education: Not on file   • Highest education level: Not on file   Occupational History   • Not on file   Tobacco Use   • Smoking status: Every Day     Types: Cigarettes   • Smokeless tobacco: Never   Vaping Use   • Vaping Use: Never used   Substance and Sexual Activity   • Alcohol use: Not Currently   • Drug use: Never   • Sexual activity: Not on file   Other Topics Concern   • Not on file   Social History Narrative   • Not on file     Social Determinants of Health     Financial Resource Strain: Not on file   Food Insecurity: Not on file   Transportation Needs: Not on file   Physical Activity: Not on file   Stress: Not on file   Social Connections: Not on file   Intimate Partner Violence: Not on file   Housing Stability: Not on file     Medications have been verified  Objective   /70   Pulse 81   Temp 98 4 °F (36 9 °C)   Resp 18   Ht 5' 5" (1 651 m)   Wt 70 3 kg (155 lb)   SpO2 98%   BMI 25 79 kg/m²      Physical Exam   Physical Exam  Vitals and nursing note reviewed  Constitutional:       General: She is not in acute distress  Appearance: She is well-developed     HENT: Head: Normocephalic and atraumatic  Right Ear: Tympanic membrane normal       Left Ear: Tympanic membrane normal       Nose: Nose normal  No mucosal edema or rhinorrhea  Right Sinus: No maxillary sinus tenderness or frontal sinus tenderness  Left Sinus: No maxillary sinus tenderness or frontal sinus tenderness  Mouth/Throat:      Pharynx: No oropharyngeal exudate or posterior oropharyngeal erythema  Eyes:      General:         Right eye: No discharge  Left eye: Discharge present  Comments: Redness to bilateral conjunctiva   Cardiovascular:      Rate and Rhythm: Normal rate and regular rhythm  Heart sounds: Normal heart sounds  No murmur heard

## 2022-12-27 NOTE — LETTER
December 27, 2022     Patient: Rusty Magdaleno   YOB: 1990   Date of Visit: 12/27/2022       To Whom It May Concern: It is my medical opinion that Rusty Magdaleno should not return to work until 12/28/22  If you have any questions or concerns, please don't hesitate to call           Sincerely,        Kate Keyes PA-C    CC: No Recipients Patient is having night shah, she does not know if it is her medication.

## 2023-06-05 ENCOUNTER — OFFICE VISIT (OUTPATIENT)
Dept: URGENT CARE | Facility: CLINIC | Age: 33
End: 2023-06-05
Payer: COMMERCIAL

## 2023-06-05 ENCOUNTER — TELEPHONE (OUTPATIENT)
Dept: OBGYN CLINIC | Facility: HOSPITAL | Age: 33
End: 2023-06-05

## 2023-06-05 VITALS
OXYGEN SATURATION: 99 % | SYSTOLIC BLOOD PRESSURE: 127 MMHG | BODY MASS INDEX: 27.02 KG/M2 | HEIGHT: 65 IN | RESPIRATION RATE: 20 BRPM | WEIGHT: 162.2 LBS | DIASTOLIC BLOOD PRESSURE: 76 MMHG | HEART RATE: 88 BPM | TEMPERATURE: 98.2 F

## 2023-06-05 DIAGNOSIS — G56.22 CUBITAL TUNNEL SYNDROME ON LEFT: Primary | ICD-10-CM

## 2023-06-05 DIAGNOSIS — L30.9 DERMATITIS: ICD-10-CM

## 2023-06-05 PROCEDURE — 99213 OFFICE O/P EST LOW 20 MIN: CPT

## 2023-06-05 RX ORDER — METHYLPREDNISOLONE 4 MG/1
TABLET ORAL
Qty: 1 EACH | Refills: 0 | Status: SHIPPED | OUTPATIENT
Start: 2023-06-05

## 2023-06-05 NOTE — PROGRESS NOTES
[facility]        NAME: Itzel Amin is a 35 y o  female  : 1990    MRN: [id number]  DATE: 2023  TIME: 6:47 PM    Assessment and Plan   Cubital tunnel syndrome on left [G56 22]  1  Cubital tunnel syndrome on left  methylPREDNISolone 4 MG tablet therapy pack    Ambulatory Referral to Orthopedic Surgery      2  Dermatitis  methylPREDNISolone 4 MG tablet therapy pack            Patient Instructions     Start Medrol dosepak as prescribed  Tylenol as needed  Ice 20 minutes 3-4 times per day  Insulate the skin from the ice to prevent frostbite  Rest and Elevate  Follow up with orthopedic if symptoms do not improve  Follow up with PCP in 3-5 days  Proceed to  ER if symptoms worsen  Chief Complaint     Chief Complaint   Patient presents with   • Arm Pain     Left arm numbness and pain 7/10 for one week  Affecting last 2 fingers- pinky and ring finger  • Rash     Pinpoint red dots on left arm only starting today          History of Present Illness       Patient is a 36 yo female with no significant PMH presenting in the clinic today for left arm pain x 1 week  Denies recent recent injury, trauma, and/or falls  Patient notes she is right handed  Patient locates her pain to the lateral aspect of the left forearm  Admits constant radiating pain to the distal forearm, left 4th and 5th fingers, left upper arm, and left pectoral region  She raters her pain 7/10  Admits numbness and tingling of left 4th and 5th fingers  Admits pain is exacerbated with elbow flexion, gripping, finger flexion, and shoulder flexion  Denies fever, chills, swelling, erythema, bruising, warmth, chest pain, and SOB  Admits the use of ibuprofen treatment for symptom management  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  Denies prior neck and elbow injuries  Denies cardiac history   Denies clotting history      Patient states she began to notice an erythematous rash along the left forearm, left hand, and left upper arm  Denies pruritic rash  Denies household members with a similar rash  Denies recent URI sx  Patient notes recent travel to the beach without going in the water  Denies recent changes in medication, diet, soaps, and detergents  Denies recent outdoor activity      Review of Systems   Review of Systems   Constitutional: Negative for chills, fatigue and fever  Respiratory: Negative for shortness of breath  Cardiovascular: Negative for chest pain and palpitations  Musculoskeletal: Positive for myalgias  Negative for arthralgias and joint swelling  Skin: Positive for rash  Negative for wound  Neurological: Positive for numbness           Current Medications       Current Outpatient Medications:   •  Fexofenadine-Pseudoephedrine (ALLEGRA-D 24 HOUR PO), Take 1 tablet by mouth in the morning, Disp: , Rfl:   •  Levonorgestrel (Mirena, 52 MG,) 20 MCG/DAY IUD, 1 Intra Uterine Device by Intrauterine route continuous, Disp: , Rfl:   •  methylPREDNISolone 4 MG tablet therapy pack, Use as directed on package, Disp: 1 each, Rfl: 0  •  rizatriptan (MAXALT-MLT) 5 mg disintegrating tablet, Take 5 mg by mouth, Disp: , Rfl:   •  propranolol (INDERAL) 10 mg tablet, Take 10 mg by mouth 2 (two) times a day, Disp: , Rfl:     Current Allergies     Allergies as of 06/05/2023 - Reviewed 06/05/2023   Allergen Reaction Noted   • Azithromycin Hives 07/03/2018   • Fexofenadine Palpitations and Tachycardia 07/03/2018   • Guaifenesin Palpitations 03/04/2020   • Iodinated contrast media Rash 03/04/2020   • Latex Rash 07/03/2018            The following portions of the patient's history were reviewed and updated as appropriate: allergies, current medications, past family history, past medical history, past social history, past surgical history and problem list      Past Medical History:   Diagnosis Date   • Anxiety    • Depression        Past Surgical History:   Procedure Laterality Date "  • KNEE SURGERY         History reviewed  No pertinent family history  Medications have been verified  Objective   /76   Pulse 88   Temp 98 2 °F (36 8 °C)   Resp 20   Ht 5' 5\" (1 651 m)   Wt 73 6 kg (162 lb 3 2 oz)   SpO2 99%   BMI 26 99 kg/m²        Physical Exam     Physical Exam  Vitals reviewed  Constitutional:       General: She is not in acute distress  Appearance: Normal appearance  She is normal weight  She is not ill-appearing  HENT:      Head: Normocephalic  Nose: Nose normal       Mouth/Throat:      Mouth: Mucous membranes are moist    Eyes:      Conjunctiva/sclera: Conjunctivae normal    Cardiovascular:      Rate and Rhythm: Normal rate and regular rhythm  Pulses: Normal pulses  Heart sounds: Normal heart sounds  No murmur heard  No friction rub  No gallop  Pulmonary:      Effort: Pulmonary effort is normal       Breath sounds: Normal breath sounds  No wheezing, rhonchi or rales  Musculoskeletal:      Right upper arm: Normal       Left upper arm: Normal       Right elbow: Normal       Left elbow: No swelling, effusion or lacerations  Decreased range of motion  Tenderness present in medial epicondyle  Right forearm: Normal       Left forearm: Tenderness (Medial proximal aspect) present  No swelling, deformity or bony tenderness  Cervical back: Normal range of motion and neck supple  Comments: Pain with motion with resisted elbow flexion, wrist flexion, and pronation/supination  Range of motion caused increase in numbness and tingling of the left fourth finger  Tinel's sign positive at the left cubital tunnel region  Skin:     General: Skin is warm  Findings: Rash present  Comments: Erythematous macular papular rash noted along posterior aspect of left forearm and left fourth finger, consistent with dermatitis  Neurological:      Mental Status: She is alert     Psychiatric:         Mood and Affect: Mood normal          " Behavior: Behavior normal

## 2023-06-05 NOTE — PATIENT INSTRUCTIONS
Start Medrol dosepak as prescribed  Tylenol as needed  Ice 20 minutes 3-4 times per day  Insulate the skin from the ice to prevent frostbite  Rest and Elevate  Follow up with orthopedic if symptoms do not improve  Follow up with PCP in 3-5 days  Proceed to ER if symptoms worsen

## 2023-06-05 NOTE — TELEPHONE ENCOUNTER
Patient is being referred to a orthopedics. Please schedule accordingly.     [address] [facility], [place]   [phone]

## 2023-06-07 ENCOUNTER — OFFICE VISIT (OUTPATIENT)
Dept: URGENT CARE | Facility: CLINIC | Age: 33
End: 2023-06-07
Payer: COMMERCIAL

## 2023-06-07 VITALS
HEART RATE: 110 BPM | TEMPERATURE: 99 F | OXYGEN SATURATION: 97 % | DIASTOLIC BLOOD PRESSURE: 88 MMHG | SYSTOLIC BLOOD PRESSURE: 129 MMHG

## 2023-06-07 DIAGNOSIS — M79.2 NERVE PAIN: ICD-10-CM

## 2023-06-07 DIAGNOSIS — L30.9 DERMATITIS: ICD-10-CM

## 2023-06-07 DIAGNOSIS — B02.9 HERPES ZOSTER WITHOUT COMPLICATION: Primary | ICD-10-CM

## 2023-06-07 PROCEDURE — 87529 HSV DNA AMP PROBE: CPT | Performed by: PHYSICIAN ASSISTANT

## 2023-06-07 PROCEDURE — 87798 DETECT AGENT NOS DNA AMP: CPT | Performed by: PHYSICIAN ASSISTANT

## 2023-06-07 PROCEDURE — 99213 OFFICE O/P EST LOW 20 MIN: CPT | Performed by: PHYSICIAN ASSISTANT

## 2023-06-07 RX ORDER — VALACYCLOVIR HYDROCHLORIDE 1 G/1
1000 TABLET, FILM COATED ORAL 3 TIMES DAILY
Qty: 30 TABLET | Refills: 0 | Status: SHIPPED | OUTPATIENT
Start: 2023-06-07 | End: 2023-06-17

## 2023-06-07 RX ORDER — GABAPENTIN 100 MG/1
100 CAPSULE ORAL 3 TIMES DAILY
Qty: 30 CAPSULE | Refills: 0 | Status: SHIPPED | OUTPATIENT
Start: 2023-06-07

## 2023-06-07 NOTE — LETTER
June 7, 2023     Patient: Hugh Krabbe   YOB: 1990   Date of Visit: 6/7/2023       To Whom It May Concern: It is my medical opinion that Hugh Krabbe may return to work on 6/8/23  If you have any questions or concerns, please don't hesitate to call           Sincerely,        Julien Black PA-C    CC: No Recipients

## 2023-06-07 NOTE — PROGRESS NOTES
[facility]    NAME: Sunil Singh is a 35 y o  female  : 1990    MRN: [id number]  DATE: 2023  TIME: 12:21 PM    Assessment and Plan   Herpes zoster without complication [H64 6]  1  Herpes zoster without complication  valACYclovir (VALTREX) 1,000 mg tablet    gabapentin (Neurontin) 100 mg capsule      2  Dermatitis  Varicella Zoster Virus DNA,PCR    HSV TYPE 1,2 DNA PCR [facility] ONLY    HSV TYPE 1,2 DNA PCR [facility] ONLY    Varicella Zoster Virus DNA,PCR      3  Nerve pain        vesicular type rash associated numbness, pain along nerve root and swelling of extremity-need to rule out shingles/hsv (smaller vesicles)  Swabs done on rash-will send to lab  Treat with anti-viral and gabapentin for pain  Patient aware of gabapentin side effects  To follow up with pcp to titrate medication and for follow up of symptoms  Patient aware of when to go to the emergency room  Patient Instructions     Patient Instructions   Continue medrol dosepak  Start valtrex  Start gabapentin, warned of side effects  Follow up with pcp to titrate and have follow up to ensure symptoms are improving  Will send out for varicella and herpes testing for vesicular rash  Chief Complaint     Chief Complaint   Patient presents with   • Arm Pain     Left arm pain, radiating up shoulder with hand swelling and numbness started from elbow to hand 2 weeks ago, now numbness from shoulder down and excruciating pain       History of Present Illness   35year old female here with complaint of ongoing left arm pain  2 weeks ago started with numbness from her left elbow through her left forearm into her 4th and 5th fingers  Rash started on left forearm, dorsal aspect of left hand and left little finger and ulnar aspect of left wrist 2 days ago  Pain described as a pressure 8-10/10 in left arm, now starts in posterior shoulder blade down left arm to hand  Hand is swollen today    No temperature change, no increased warmth and no coldness in the extremity  No fever, chills  No known injury  Rash does not itch  Review of Systems   Review of Systems   Constitutional: Negative for appetite change, chills and fever  HENT: Negative for congestion, ear discharge, ear pain, facial swelling, postnasal drip, sinus pressure, sneezing and sore throat  Respiratory: Negative for cough, shortness of breath and wheezing  Musculoskeletal:        Left arm pain, swelling   numbness   Skin: Positive for rash  Neurological: Positive for numbness  Negative for headaches         Current Medications     Current Outpatient Medications:   •  gabapentin (Neurontin) 100 mg capsule, Take 1 capsule (100 mg total) by mouth 3 (three) times a day, Disp: 30 capsule, Rfl: 0  •  valACYclovir (VALTREX) 1,000 mg tablet, Take 1 tablet (1,000 mg total) by mouth 3 (three) times a day for 10 days, Disp: 30 tablet, Rfl: 0  •  Fexofenadine-Pseudoephedrine (ALLEGRA-D 24 HOUR PO), Take 1 tablet by mouth in the morning, Disp: , Rfl:   •  Levonorgestrel (Mirena, 52 MG,) 20 MCG/DAY IUD, 1 Intra Uterine Device by Intrauterine route continuous, Disp: , Rfl:   •  methylPREDNISolone 4 MG tablet therapy pack, Use as directed on package, Disp: 1 each, Rfl: 0  •  propranolol (INDERAL) 10 mg tablet, Take 10 mg by mouth 2 (two) times a day, Disp: , Rfl:   •  rizatriptan (MAXALT-MLT) 5 mg disintegrating tablet, Take 5 mg by mouth, Disp: , Rfl:     Current Allergies     Allergies as of 06/07/2023 - Reviewed 06/07/2023   Allergen Reaction Noted   • Azithromycin Hives 07/03/2018   • Fexofenadine Palpitations and Tachycardia 07/03/2018   • Guaifenesin Palpitations 03/04/2020   • Iodinated contrast media Rash 03/04/2020   • Latex Rash 07/03/2018          The following portions of the patient's history were reviewed and updated as appropriate: allergies, current medications, past family history, past medical history, past social history, past surgical history and problem list    Past Medical History:   Diagnosis Date   • Anxiety    • Depression      Past Surgical History:   Procedure Laterality Date   • KNEE SURGERY       History reviewed  No pertinent family history  Social History     Socioeconomic History   • Marital status: /Civil Union     Spouse name: Not on file   • Number of children: Not on file   • Years of education: Not on file   • Highest education level: Not on file   Occupational History   • Not on file   Tobacco Use   • Smoking status: Every Day     Packs/day: 0 25     Types: Cigarettes   • Smokeless tobacco: Never   Vaping Use   • Vaping Use: Never used   Substance and Sexual Activity   • Alcohol use: Not Currently   • Drug use: Never   • Sexual activity: Not on file   Other Topics Concern   • Not on file   Social History Narrative   • Not on file     Social Determinants of Health     Financial Resource Strain: Not on file   Food Insecurity: Not on file   Transportation Needs: Not on file   Physical Activity: Not on file   Stress: Not on file   Social Connections: Not on file   Intimate Partner Violence: Not on file   Housing Stability: Not on file     Medications have been verified  Objective   /88 (BP Location: Right arm, Patient Position: Sitting)   Pulse (!) 110   Temp 99 °F (37 2 °C)   SpO2 97%      Physical Exam   Physical Exam  Vitals and nursing note reviewed  Constitutional:       Appearance: Normal appearance  Cardiovascular:      Rate and Rhythm: Normal rate and regular rhythm  Pulses: Normal pulses  Heart sounds: Normal heart sounds  No murmur heard  Pulmonary:      Effort: Pulmonary effort is normal  No respiratory distress  Breath sounds: Normal breath sounds  Musculoskeletal:      Left upper arm: No swelling, deformity, lacerations or tenderness  Left forearm: Swelling and tenderness present  Left wrist: Swelling and tenderness present  Normal pulse  Left hand: Swelling and tenderness present  Normal strength  Normal sensation  Normal capillary refill  Normal pulse  Cervical back: Normal range of motion  Skin:     Findings: Erythema and rash present  Rash is vesicular  Neurological:      Mental Status: She is alert

## 2023-06-07 NOTE — PATIENT INSTRUCTIONS
Continue medrol dosepak  Start valtrex  Start gabapentin, warned of side effects  Follow up with pcp to titrate and have follow up to ensure symptoms are improving  Will send out for varicella and herpes testing for vesicular rash

## 2023-06-08 LAB
HSV1 DNA SPEC QL NAA+PROBE: NOT DETECTED
HSV1 DNA SPEC QL NAA+PROBE: NOT DETECTED

## 2023-06-11 LAB — VZV DNA SPEC QL NAA+PROBE: NEGATIVE

## 2023-09-17 ENCOUNTER — OFFICE VISIT (OUTPATIENT)
Dept: URGENT CARE | Facility: CLINIC | Age: 33
End: 2023-09-17
Payer: COMMERCIAL

## 2023-09-17 VITALS
TEMPERATURE: 97.9 F | OXYGEN SATURATION: 97 % | DIASTOLIC BLOOD PRESSURE: 80 MMHG | HEART RATE: 87 BPM | RESPIRATION RATE: 18 BRPM | SYSTOLIC BLOOD PRESSURE: 116 MMHG

## 2023-09-17 DIAGNOSIS — J20.9 ACUTE BRONCHITIS, UNSPECIFIED ORGANISM: Primary | ICD-10-CM

## 2023-09-17 PROCEDURE — 99213 OFFICE O/P EST LOW 20 MIN: CPT | Performed by: PHYSICIAN ASSISTANT

## 2023-09-17 RX ORDER — BENZONATATE 200 MG/1
200 CAPSULE ORAL 3 TIMES DAILY PRN
Qty: 20 CAPSULE | Refills: 0 | Status: SHIPPED | OUTPATIENT
Start: 2023-09-17

## 2023-09-17 RX ORDER — CEFUROXIME AXETIL 500 MG/1
500 TABLET ORAL EVERY 12 HOURS SCHEDULED
Qty: 20 TABLET | Refills: 0 | Status: SHIPPED | OUTPATIENT
Start: 2023-09-17 | End: 2023-09-27

## 2023-09-17 RX ORDER — PREDNISONE 10 MG/1
TABLET ORAL
Qty: 26 TABLET | Refills: 0 | Status: SHIPPED | OUTPATIENT
Start: 2023-09-17

## 2023-09-17 NOTE — PATIENT INSTRUCTIONS
I have prescribed an antibiotic for the infection. Please take the antibiotic as prescribed and finish the entire prescription. I recommend that the patient takes an over the counter probiotic or eats yogurt with live cultures in it [place]) to keep good bacteria in the gut and help prevent diarrhea. Wash hands frequently to prevent the spread of infection. Can use over the counter cough and cold medications to help with symptoms. Ibuprofen and/or tylenol as needed for pain or fever. If not improving over the next 7-10 days, follow up with PCP. Prednisone as directed  Tessalon as needed.

## 2023-09-17 NOTE — PROGRESS NOTES
[facility]    NAME: Dominique Davis is a 35 y.o. female  : 1990    MRN: [id number]  DATE: 2023  TIME: 9:04 AM    Assessment and Plan   Acute bronchitis, unspecified organism [J20.9]  1. Acute bronchitis, unspecified organism  predniSONE 10 mg tablet    cefuroxime (CEFTIN) 500 mg tablet    benzonatate (TESSALON) 200 MG capsule          Patient Instructions   Patient Instructions   I have prescribed an antibiotic for the infection. Please take the antibiotic as prescribed and finish the entire prescription. I recommend that the patient takes an over the counter probiotic or eats yogurt with live cultures in it [place]) to keep good bacteria in the gut and help prevent diarrhea. Wash hands frequently to prevent the spread of infection. Can use over the counter cough and cold medications to help with symptoms. Ibuprofen and/or tylenol as needed for pain or fever. If not improving over the next 7-10 days, follow up with PCP. Prednisone as directed  Tessalon as needed. Chief Complaint     Chief Complaint   Patient presents with   • Cold Like Symptoms     C/o chest congestion, dtr recently had URI, took robitussin, not helping, started Thursday night, dtr tested negative for covid and strep        History of Present Illness   35year old female here with complaint of chest congestion, productive cough for the past 4 days. Over the counter meds not helping, no fever, chills. Mild nasal congestion. Daughter sick this past week with URI. Negative covid. Review of Systems   Review of Systems   Constitutional: Positive for fatigue. Negative for appetite change, chills and fever. HENT: Positive for congestion. Negative for ear discharge, ear pain, facial swelling, postnasal drip, sinus pressure, sneezing and sore throat. Respiratory: Positive for cough, chest tightness and wheezing. Negative for shortness of breath. Neurological: Negative for headaches. Current Medications     Current Outpatient Medications:   •  benzonatate (TESSALON) 200 MG capsule, Take 1 capsule (200 mg total) by mouth 3 (three) times a day as needed for cough, Disp: 20 capsule, Rfl: 0  •  cefuroxime (CEFTIN) 500 mg tablet, Take 1 tablet (500 mg total) by mouth every 12 (twelve) hours for 10 days, Disp: 20 tablet, Rfl: 0  •  Fexofenadine-Pseudoephedrine (ALLEGRA-D 24 HOUR PO), Take 1 tablet by mouth in the morning, Disp: , Rfl:   •  Levonorgestrel (Mirena, 52 MG,) 20 MCG/DAY IUD, 1 Intra Uterine Device by Intrauterine route continuous, Disp: , Rfl:   •  predniSONE 10 mg tablet, Take 3 tabs BID X 2 days, 2 tabs BID X 2 days, 1 tab BID X 2 days, 1 tab daily X 2 days, Disp: 26 tablet, Rfl: 0  •  propranolol (INDERAL) 10 mg tablet, Take 10 mg by mouth 2 (two) times a day, Disp: , Rfl:   •  rizatriptan (MAXALT-MLT) 5 mg disintegrating tablet, Take 5 mg by mouth, Disp: , Rfl:   •  gabapentin (Neurontin) 100 mg capsule, Take 1 capsule (100 mg total) by mouth 3 (three) times a day (Patient not taking: Reported on 9/17/2023), Disp: 30 capsule, Rfl: 0  •  methylPREDNISolone 4 MG tablet therapy pack, Use as directed on package (Patient not taking: Reported on 9/17/2023), Disp: 1 each, Rfl: 0  •  valACYclovir (VALTREX) 1,000 mg tablet, Take 1 tablet (1,000 mg total) by mouth 3 (three) times a day for 10 days, Disp: 30 tablet, Rfl: 0    Current Allergies     Allergies as of 09/17/2023 - Reviewed 09/17/2023   Allergen Reaction Noted   • Azithromycin Hives 07/03/2018   • Fexofenadine Palpitations and Tachycardia 07/03/2018   • Guaifenesin Palpitations 03/04/2020   • Iodinated contrast media Rash 03/04/2020   • Latex Rash 07/03/2018          The following portions of the patient's history were reviewed and updated as appropriate: allergies, current medications, past family history, past medical history, past social history, past surgical history and problem list.   Past Medical History:   Diagnosis Date • Anxiety    • Depression      Past Surgical History:   Procedure Laterality Date   • KNEE SURGERY       History reviewed. No pertinent family history. Social History     Socioeconomic History   • Marital status: /Civil Union     Spouse name: Not on file   • Number of children: Not on file   • Years of education: Not on file   • Highest education level: Not on file   Occupational History   • Not on file   Tobacco Use   • Smoking status: Every Day     Packs/day: 0.25     Types: Cigarettes   • Smokeless tobacco: Never   Vaping Use   • Vaping Use: Never used   Substance and Sexual Activity   • Alcohol use: Not Currently   • Drug use: Never   • Sexual activity: Not on file   Other Topics Concern   • Not on file   Social History Narrative   • Not on file     Social Determinants of Health     Financial Resource Strain: Not on file   Food Insecurity: Not on file   Transportation Needs: Not on file   Physical Activity: Not on file   Stress: Not on file   Social Connections: Not on file   Intimate Partner Violence: Not on file   Housing Stability: Not on file     Medications have been verified. Objective   /80   Pulse 87   Temp 97.9 °F (36.6 °C)   Resp 18   SpO2 97%      Physical Exam   Physical Exam  Vitals and nursing note reviewed. Constitutional:       General: She is not in acute distress. Appearance: She is well-developed. HENT:      Head: Normocephalic and atraumatic. Right Ear: Tympanic membrane normal.      Left Ear: Tympanic membrane normal.      Nose: Congestion present. No mucosal edema. Right Sinus: No maxillary sinus tenderness or frontal sinus tenderness. Left Sinus: No maxillary sinus tenderness or frontal sinus tenderness. Mouth/Throat:      Pharynx: No oropharyngeal exudate or posterior oropharyngeal erythema. Eyes:      Conjunctiva/sclera: Conjunctivae normal.   Cardiovascular:      Rate and Rhythm: Normal rate and regular rhythm.       Heart sounds: Normal heart sounds. No murmur heard. Pulmonary:      Effort: Pulmonary effort is normal.      Breath sounds: Wheezing present. Musculoskeletal:      Cervical back: Normal range of motion.

## 2024-02-26 ENCOUNTER — OFFICE VISIT (OUTPATIENT)
Dept: URGENT CARE | Facility: CLINIC | Age: 34
End: 2024-02-26
Payer: COMMERCIAL

## 2024-02-26 VITALS
BODY MASS INDEX: 25.03 KG/M2 | OXYGEN SATURATION: 96 % | RESPIRATION RATE: 18 BRPM | TEMPERATURE: 98.1 F | HEART RATE: 102 BPM | WEIGHT: 150.4 LBS

## 2024-02-26 DIAGNOSIS — J02.9 SORE THROAT: Primary | ICD-10-CM

## 2024-02-26 LAB — S PYO AG THROAT QL: NEGATIVE

## 2024-02-26 PROCEDURE — 87070 CULTURE OTHR SPECIMN AEROBIC: CPT | Performed by: PHYSICIAN ASSISTANT

## 2024-02-26 PROCEDURE — 87880 STREP A ASSAY W/OPTIC: CPT | Performed by: PHYSICIAN ASSISTANT

## 2024-02-26 PROCEDURE — 99213 OFFICE O/P EST LOW 20 MIN: CPT | Performed by: PHYSICIAN ASSISTANT

## 2024-02-26 NOTE — PATIENT INSTRUCTIONS
Rapid strep negative, will send for culture and follow-up if needed.  Recommend continuing supportive care.  Work note provided.  All patient's questions answered.

## 2024-02-26 NOTE — PROGRESS NOTES
[facility]        NAME: Yary Bocanegra is a 33 y.o. female  : 1990    MRN: [id number]  DATE: 2024  TIME: 2:14 PM    Assessment and Plan   Sore throat [J02.9]  1. Sore throat  POCT rapid strepA    Throat culture            Patient Instructions     Patient Instructions   Rapid strep negative, will send for culture and follow-up if needed.  Recommend continuing supportive care.  Work note provided.  All patient's questions answered.      Follow up with PCP in 3-5 days.  Proceed to  ER if symptoms worsen.    Chief Complaint     Chief Complaint   Patient presents with    Sore Throat     Swollen glands started yesterday          History of Present Illness       Patient is a 33-year-old female presenting today with sore throat x 1 day.  Patient notes yesterday she started with today sore throat, states that she looked in the mirror this morning and noticed some white patches on her tonsils prompting her visit here today.  Has not taken any medication limiting factors for her symptoms.  Denies fever, chills, trouble swallowing, voice change, drooling.        Review of Systems   Review of Systems   Constitutional:  Negative for chills and fever.   HENT:  Positive for sore throat. Negative for ear pain.    Eyes:  Negative for pain and visual disturbance.   Respiratory:  Negative for cough and shortness of breath.    Cardiovascular:  Negative for chest pain and palpitations.   Gastrointestinal:  Negative for abdominal pain and vomiting.   Genitourinary:  Negative for dysuria and hematuria.   Musculoskeletal:  Negative for arthralgias and back pain.   Skin:  Negative for color change and rash.   Neurological:  Negative for seizures and syncope.   All other systems reviewed and are negative.        Current Medications       Current Outpatient Medications:     benzonatate (TESSALON) 200 MG capsule, Take 1 capsule (200 mg total) by mouth 3 (three) times a day as needed for cough, Disp: 20 capsule,  Rfl: 0    Fexofenadine-Pseudoephedrine (ALLEGRA-D 24 HOUR PO), Take 1 tablet by mouth in the morning, Disp: , Rfl:     gabapentin (Neurontin) 100 mg capsule, Take 1 capsule (100 mg total) by mouth 3 (three) times a day (Patient not taking: Reported on 9/17/2023), Disp: 30 capsule, Rfl: 0    Levonorgestrel (Mirena, 52 MG,) 20 MCG/DAY IUD, 1 Intra Uterine Device by Intrauterine route continuous, Disp: , Rfl:     methylPREDNISolone 4 MG tablet therapy pack, Use as directed on package (Patient not taking: Reported on 9/17/2023), Disp: 1 each, Rfl: 0    predniSONE 10 mg tablet, Take 3 tabs BID X 2 days, 2 tabs BID X 2 days, 1 tab BID X 2 days, 1 tab daily X 2 days, Disp: 26 tablet, Rfl: 0    propranolol (INDERAL) 10 mg tablet, Take 10 mg by mouth 2 (two) times a day, Disp: , Rfl:     rizatriptan (MAXALT-MLT) 5 mg disintegrating tablet, Take 5 mg by mouth, Disp: , Rfl:     valACYclovir (VALTREX) 1,000 mg tablet, Take 1 tablet (1,000 mg total) by mouth 3 (three) times a day for 10 days, Disp: 30 tablet, Rfl: 0    Current Allergies     Allergies as of 02/26/2024 - Reviewed 02/26/2024   Allergen Reaction Noted    Azithromycin Hives 07/03/2018    Fexofenadine Palpitations and Tachycardia 07/03/2018    Guaifenesin Palpitations 03/04/2020    Iodinated contrast media Rash 03/04/2020    Latex Rash 07/03/2018            The following portions of the patient's history were reviewed and updated as appropriate: allergies, current medications, past family history, past medical history, past social history, past surgical history and problem list.     Past Medical History:   Diagnosis Date    Anxiety     Depression        Past Surgical History:   Procedure Laterality Date    KNEE SURGERY         No family history on file.      Medications have been verified.        Objective   Pulse 102   Temp 98.1 °F (36.7 °C)   Resp 18   Wt 68.2 kg (150 lb 6.4 oz)   SpO2 96%   BMI 25.03 kg/m²        Physical Exam     Physical Exam  Vitals and  nursing note reviewed.   Constitutional:       General: She is not in acute distress.     Appearance: Normal appearance.   HENT:      Head: Normocephalic.      Right Ear: Tympanic membrane, ear canal and external ear normal.      Left Ear: Tympanic membrane, ear canal and external ear normal.      Nose: Nose normal.      Mouth/Throat:      Mouth: Mucous membranes are moist.      Tonsils: Tonsillar exudate present. 1+ on the right. 1+ on the left.   Eyes:      Conjunctiva/sclera: Conjunctivae normal.   Cardiovascular:      Rate and Rhythm: Normal rate and regular rhythm.      Pulses: Normal pulses.      Heart sounds: Normal heart sounds.   Pulmonary:      Effort: Pulmonary effort is normal.      Breath sounds: Normal breath sounds.   Lymphadenopathy:      Cervical: Cervical adenopathy present.   Skin:     General: Skin is warm.      Capillary Refill: Capillary refill takes less than 2 seconds.   Neurological:      Mental Status: She is alert.

## 2024-02-26 NOTE — LETTER
February 26, 2024     Patient: Yary Bocanegra   YOB: 1990   Date of Visit: 2/26/2024       To Whom It May Concern:    It is my medical opinion that Yary Bocanegra may return to work on 02/27/2024 .    If you have any questions or concerns, please don't hesitate to call.         Sincerely,        Kei Lerma PA-C    CC: No Recipients

## 2024-02-28 LAB — BACTERIA THROAT CULT: NORMAL

## 2024-04-02 ENCOUNTER — APPOINTMENT (EMERGENCY)
Dept: RADIOLOGY | Facility: HOSPITAL | Age: 34
End: 2024-04-02
Payer: COMMERCIAL

## 2024-04-02 ENCOUNTER — HOSPITAL ENCOUNTER (EMERGENCY)
Facility: HOSPITAL | Age: 34
Discharge: HOME | End: 2024-04-02
Attending: EMERGENCY MEDICINE
Payer: COMMERCIAL

## 2024-04-02 VITALS
RESPIRATION RATE: 18 BRPM | SYSTOLIC BLOOD PRESSURE: 121 MMHG | WEIGHT: 160 LBS | TEMPERATURE: 97.6 F | DIASTOLIC BLOOD PRESSURE: 73 MMHG | HEIGHT: 65 IN | HEART RATE: 110 BPM | OXYGEN SATURATION: 96 % | BODY MASS INDEX: 26.66 KG/M2

## 2024-04-02 DIAGNOSIS — M25.561 ACUTE PAIN OF RIGHT KNEE: Primary | ICD-10-CM

## 2024-04-02 PROCEDURE — 99283 EMERGENCY DEPT VISIT LOW MDM: CPT

## 2024-04-02 PROCEDURE — 99284 EMERGENCY DEPT VISIT MOD MDM: CPT | Performed by: EMERGENCY MEDICINE

## 2024-04-02 PROCEDURE — 73564 X-RAY EXAM KNEE 4 OR MORE: CPT

## 2024-04-02 RX ORDER — OXYCODONE HYDROCHLORIDE 5 MG/1
5 TABLET ORAL ONCE
Status: COMPLETED | OUTPATIENT
Start: 2024-04-02 | End: 2024-04-02

## 2024-04-02 RX ORDER — OXYCODONE HYDROCHLORIDE 5 MG/1
5 TABLET ORAL EVERY 8 HOURS PRN
Qty: 12 TABLET | Refills: 0 | Status: SHIPPED | OUTPATIENT
Start: 2024-04-02 | End: 2024-04-06

## 2024-04-02 RX ADMIN — OXYCODONE HYDROCHLORIDE 5 MG: 5 TABLET ORAL at 19:33

## 2024-04-02 NOTE — Clinical Note
Yary Bocanegra was seen and treated in our emergency department on 4/2/2024.            light duty with ability to use cruthces and knee immobilizer for R leg until seen by Orthopedics    Diagnosis:     Yary  .    She may return on this date:          If you have any questions or concerns, please don't hesitate to call.      Lemuel Walker, DO    ______________________________           _______________          _______________  Hospital Representative                              Date                                Time

## 2024-04-02 NOTE — Clinical Note
Yary Bocanegra was seen and treated in our emergency department on 4/2/2024.                Diagnosis:     Yary  may return to work on return date.    She may return on this date: 04/04/2024         If you have any questions or concerns, please don't hesitate to call.      Lemuel Walker, DO    ______________________________           _______________          _______________  Hospital Representative                              Date                                Time

## 2024-04-02 NOTE — ED PROVIDER NOTES
History  Chief Complaint   Patient presents with    Knee Pain     Right knee pain just happened/ NO trauma.      34 yo female presenting to the ED for R knee pain which reportedly started when she was going down the stairs to take her dog outside about 3 hours ago. Denies fall or any trauma to the area. Denies any pain anywhere else. Has had prior patellar surgery a few years ago on the same knee. Tried 800mg Ibuprofen after this happened with no relief. States pain is mostly when trying to bend the knee.         Prior to Admission Medications   Prescriptions Last Dose Informant Patient Reported? Taking?   Fexofenadine-Pseudoephedrine (ALLEGRA-D 24 HOUR PO)   Yes No   Sig: Take 1 tablet by mouth in the morning   Levonorgestrel (Mirena, 52 MG,) 20 MCG/DAY IUD   Yes No   Si Intra Uterine Device by Intrauterine route continuous   benzonatate (TESSALON) 200 MG capsule   No No   Sig: Take 1 capsule (200 mg total) by mouth 3 (three) times a day as needed for cough   gabapentin (Neurontin) 100 mg capsule   No No   Sig: Take 1 capsule (100 mg total) by mouth 3 (three) times a day   Patient not taking: Reported on 2023   methylPREDNISolone 4 MG tablet therapy pack   No No   Sig: Use as directed on package   Patient not taking: Reported on 2023   predniSONE 10 mg tablet   No No   Sig: Take 3 tabs BID X 2 days, 2 tabs BID X 2 days, 1 tab BID X 2 days, 1 tab daily X 2 days   propranolol (INDERAL) 10 mg tablet   Yes No   Sig: Take 10 mg by mouth 2 (two) times a day   rizatriptan (MAXALT-MLT) 5 mg disintegrating tablet   Yes No   Sig: Take 5 mg by mouth   valACYclovir (VALTREX) 1,000 mg tablet   No No   Sig: Take 1 tablet (1,000 mg total) by mouth 3 (three) times a day for 10 days      Facility-Administered Medications: None       Past Medical History:   Diagnosis Date    Anxiety     Depression        Past Surgical History:   Procedure Laterality Date    KNEE SURGERY         No family history on file.  I have  reviewed and agree with the history as documented.    E-Cigarette/Vaping    E-Cigarette Use Never User      E-Cigarette/Vaping Substances     Social History     Tobacco Use    Smoking status: Every Day     Current packs/day: 0.25     Types: Cigarettes    Smokeless tobacco: Never   Vaping Use    Vaping status: Never Used   Substance Use Topics    Alcohol use: Not Currently    Drug use: Never       Review of Systems   Musculoskeletal:  Positive for arthralgias (R knee).   All other systems reviewed and are negative.      Physical Exam  Physical Exam  Musculoskeletal:      Right hip: Normal.      Left hip: Normal.      Right upper leg: Normal.      Left upper leg: Normal.      Right knee: Bony tenderness present. No swelling, deformity, effusion, erythema, ecchymosis, lacerations or crepitus. Decreased range of motion. Tenderness present over the medial joint line. No lateral joint line, MCL, LCL, ACL, PCL or patellar tendon tenderness. No LCL laxity, MCL laxity, ACL laxity or PCL laxity. Normal alignment, normal meniscus and normal patellar mobility. Normal pulse.      Instability Tests: Anterior drawer test negative. Posterior drawer test negative. Anterior Lachman test negative. Medial Loyd test negative and lateral Loyd test negative.      Left knee: Normal.      Comments: Pain with ranging R knee with limitation in flexion at approximately 30-40 degrees    RLE DP and PT pulses 2+  Sensation equal and intact with contralateral leg       General: VS reviewed  Appears in NAD  awake, alert.   Well-nourished, well-developed. Appears stated age.   Speaking normally in full sentences.   Head: Normocephalic, atraumatic  Eyes: EOM-I. No diplopia.   No hyphema.   No subconjunctival hemorrhages.  Symmetrical lids.   ENT: Atraumatic external nose and ears.    MMM  No malocclusion. No stridor. Normal phonation. No drooling. Normal swallowing.   Neck: No JVD.  CV: No pallor noted  Lungs:   No tachypnea  No respiratory  distress  Skin: Dry, intact.   Neuro: Awake, alert, GCS15, CN II-XII grossly intact.   Motor grossly intact.  Psychiatric/Behavioral: Appropriate mood and affect   Exam: deferred    Vital Signs  ED Triage Vitals   Temperature Pulse Respirations Blood Pressure SpO2   04/02/24 1913 04/02/24 1913 04/02/24 1913 04/02/24 1920 04/02/24 1913   97.6 °F (36.4 °C) (!) 139 18 126/86 98 %      Temp Source Heart Rate Source Patient Position - Orthostatic VS BP Location FiO2 (%)   04/02/24 1913 04/02/24 1913 -- 04/02/24 1913 --   Temporal Monitor  Left arm       Pain Score       04/02/24 1913       8           Vitals:    04/02/24 1924 04/02/24 1950 04/02/24 2008 04/02/24 2010   BP:  112/77  121/73   Pulse: (!) 122 (!) 115 (!) 110          Visual Acuity      ED Medications  Medications   oxyCODONE (ROXICODONE) IR tablet 5 mg (5 mg Oral Given 4/2/24 1933)       Diagnostic Studies  Results Reviewed       None                   XR knee 4+ vw right injury    (Results Pending)              Procedures  Procedures         ED Course  ED Course as of 04/02/24 2118 Tue Apr 02, 2024 2023 Patient feeling better with pain medication and ambulatory with knee immobilizer and crutches                               SBIRT 20yo+      Flowsheet Row Most Recent Value   Initial Alcohol Screen: US AUDIT-C     1. How often do you have a drink containing alcohol? 0 Filed at: 04/02/2024 1917   2. How many drinks containing alcohol do you have on a typical day you are drinking?  0 Filed at: 04/02/2024 1917   3a. Male UNDER 65: How often do you have five or more drinks on one occasion? 0 Filed at: 04/02/2024 1917   3b. FEMALE Any Age, or MALE 65+: How often do you have 4 or more drinks on one occassion? 0 Filed at: 04/02/2024 1917   Audit-C Score 0 Filed at: 04/02/2024 1917   KJ: How many times in the past year have you...    Used an illegal drug or used a prescription medication for non-medical reasons? Never Filed at: 04/02/2024 1917                       Medical Decision Making  32 yo female with R knee pain after going down the stairs earlier, no fall or other injury. Concern for possible meniscal or ligamentous injury as tenderness over medial joint line. RLE NVI    Amount and/or Complexity of Data Reviewed  Radiology: ordered.    Risk  Prescription drug management.             Disposition  Final diagnoses:   Acute pain of right knee     Time reflects when diagnosis was documented in both MDM as applicable and the Disposition within this note       Time User Action Codes Description Comment    4/2/2024  8:24 PM Lemuel Walker [M25.561] Acute pain of right knee           ED Disposition       ED Disposition   Discharge    Condition   Stable    Date/Time   Tue Apr 2, 2024 2023    Comment   Yary Bocanegra discharge to home/self care.                   Follow-up Information       Follow up With Specialties Details Why Contact Info Additional Information    Lamin Gonzales,     [address] [place] [id number]  [phone]       [facility] Schedule an appointment as soon as possible for a visit   [address] [place] [id number] [phone] [facility], [address] [place] [address] [place], [address], [phone]            Discharge Medication List as of 4/2/2024  8:28 PM        START taking these medications    Details   oxyCODONE (ROXICODONE) 5 immediate release tablet Take 1 tablet (5 mg total) by mouth every 8 (eight) hours as needed for moderate pain or severe pain for up to 4 days Max Daily Amount: 15 mg, Starting Tue 4/2/2024, Until Sat 4/6/2024 at 2359, Normal           CONTINUE these medications which have NOT CHANGED    Details   benzonatate (TESSALON) 200 MG capsule Take 1 capsule (200 mg total) by mouth 3 (three) times a day as needed for cough, Starting Sun 9/17/2023, Normal      Fexofenadine-Pseudoephedrine (ALLEGRA-D  24 HOUR PO) Take 1 tablet by mouth in the morning, Historical Med      gabapentin (Neurontin) 100 mg capsule Take 1 capsule (100 mg total) by mouth 3 (three) times a day, Starting Wed 6/7/2023, Normal      Levonorgestrel (Mirena, 52 MG,) 20 MCG/DAY IUD 1 Intra Uterine Device by Intrauterine route continuous, Historical Med      methylPREDNISolone 4 MG tablet therapy pack Use as directed on package, Normal      predniSONE 10 mg tablet Take 3 tabs BID X 2 days, 2 tabs BID X 2 days, 1 tab BID X 2 days, 1 tab daily X 2 days, Normal      propranolol (INDERAL) 10 mg tablet Take 10 mg by mouth 2 (two) times a day, Starting Wed 10/12/2022, Until Sun 9/17/2023, Historical Med      rizatriptan (MAXALT-MLT) 5 mg disintegrating tablet Take 5 mg by mouth, Starting Wed 10/12/2022, Until Thu 10/12/2023 at 2359, Historical Med      valACYclovir (VALTREX) 1,000 mg tablet Take 1 tablet (1,000 mg total) by mouth 3 (three) times a day for 10 days, Starting Wed 6/7/2023, Until Sat 6/17/2023, Normal                 PDMP Review         Value Time User    PDMP Reviewed  Yes 4/2/2024  7:21 PM Lemuel Walker DO            ED Provider  Electronically Signed by             Lemuel Walker DO  04/02/24 3760

## 2024-04-03 NOTE — DISCHARGE INSTRUCTIONS
If you develop any new or worsening symptoms please immediately return to your nearest emergency department.

## 2024-04-05 ENCOUNTER — OFFICE VISIT (OUTPATIENT)
Dept: OBGYN CLINIC | Facility: CLINIC | Age: 34
End: 2024-04-05
Payer: COMMERCIAL

## 2024-04-05 VITALS
WEIGHT: 160 LBS | HEIGHT: 65 IN | DIASTOLIC BLOOD PRESSURE: 87 MMHG | BODY MASS INDEX: 26.66 KG/M2 | HEART RATE: 97 BPM | SYSTOLIC BLOOD PRESSURE: 119 MMHG

## 2024-04-05 DIAGNOSIS — M70.51 PES ANSERINUS BURSITIS OF RIGHT KNEE: ICD-10-CM

## 2024-04-05 DIAGNOSIS — M25.561 ACUTE PAIN OF RIGHT KNEE: ICD-10-CM

## 2024-04-05 DIAGNOSIS — Z01.89 ENCOUNTER FOR LOWER EXTREMITY COMPARISON IMAGING STUDY: ICD-10-CM

## 2024-04-05 DIAGNOSIS — M25.561 RIGHT KNEE PAIN, UNSPECIFIED CHRONICITY: Primary | ICD-10-CM

## 2024-04-05 PROCEDURE — 99204 OFFICE O/P NEW MOD 45 MIN: CPT | Performed by: STUDENT IN AN ORGANIZED HEALTH CARE EDUCATION/TRAINING PROGRAM

## 2024-04-05 NOTE — PROGRESS NOTES
Ortho Sports Medicine Knee New Patient Visit     Assesment:   33 y.o. female with right knee pain ongoing for 3 days and improving without any specific injury. Exam consistent with pes anserine bursitis with hamstring tendonitis.    Plan:  I reviewed the history, exam, and imaging with the patient in clinic today.  I did review the patient's x-rays which show no evidence of fracture, degenerative changes, and orthopedic hardware in appropriate position.  On exam, she is her tenderness is most pronounced over the pes insertion.  I discussed with the patient that her symptoms are most likely due to pes bursitis.  The patient's symptoms have improved over the past 3 days with medications.  I discussed potential treatment option including medications, physical therapy, and injections.  The patient preferred to continue with her over-the-counter medications as well as physical therapy.  She deferred on injection at this time.  I did provide her with a prescription for physical therapy.  I discussed that she should follow-up in 6 weeks if she continues to have symptoms.  I discussed that at the time of her symptoms persist we could try an injection.  Patient demonstrated understanding discussion was agreed with plan.  All her questions were answered.  I will see her back in 6 weeks for repeat evaluation.  She will reach out to clinic with any questions or concerns anytime.      Conservative treatment:  Ice to knee for 20 minutes at least 1-2 times daily.  PT for ROM/strengthening to knee, hip and core.  OTC NSAIDS prn for pain.  Let pain guide activities.    Imaging:  All imaging from today was reviewed by myself and explained to the patient.     Injection:  No Injection planned at this time.  May consider future corticosteroid injection depending on clinical exam/imaging.    Surgery:   No surgery is recommended at this point, continue with conservative treatment plan as noted.    Follow up:  Return in about 6 weeks  (around 5/17/2024).        Chief Complaint   Patient presents with    Right Knee - Pain       History of Present Illness:  The patient is a 33 y.o. female seen in clinic for right knee pain. The pain started 3 days ago. The patient denies any known mechanism of injury but states the pain occurred with descending the stairs. Patient felt immediate pain. The pain is now located anteromedially and is associated with limited ROM and instability. The patient characterizes the intensity of pain as a 6 out of 10 today. Symptoms are aggravated by stairs. The patient has tried oxycodone, Ibuprofen and rest. Symptoms have improved since the injury as she is now able to flex the knee and weightbear. Patient has a history of prior surgery on the right knee for patella instability likely MPFL repair versus reconstruction.      Past Medical, Social and Family History:  Past Medical History:   Diagnosis Date    Anxiety     Depression      Past Surgical History:   Procedure Laterality Date    KNEE SURGERY       Allergies   Allergen Reactions    Azithromycin Hives    Fexofenadine Palpitations and Tachycardia    Guaifenesin Palpitations    Iodinated Contrast Media Rash    Latex Rash     Current Outpatient Medications on File Prior to Visit   Medication Sig Dispense Refill    Fexofenadine-Pseudoephedrine (ALLEGRA-D 24 HOUR PO) Take 1 tablet by mouth in the morning      Levonorgestrel (Mirena, 52 MG,) 20 MCG/DAY IUD 1 Intra Uterine Device by Intrauterine route continuous      oxyCODONE (ROXICODONE) 5 immediate release tablet Take 1 tablet (5 mg total) by mouth every 8 (eight) hours as needed for moderate pain or severe pain for up to 4 days Max Daily Amount: 15 mg 12 tablet 0    predniSONE 10 mg tablet Take 3 tabs BID X 2 days, 2 tabs BID X 2 days, 1 tab BID X 2 days, 1 tab daily X 2 days 26 tablet 0    benzonatate (TESSALON) 200 MG capsule Take 1 capsule (200 mg total) by mouth 3 (three) times a day as needed for cough (Patient not  taking: Reported on 4/5/2024) 20 capsule 0    gabapentin (Neurontin) 100 mg capsule Take 1 capsule (100 mg total) by mouth 3 (three) times a day (Patient not taking: Reported on 9/17/2023) 30 capsule 0    methylPREDNISolone 4 MG tablet therapy pack Use as directed on package (Patient not taking: Reported on 9/17/2023) 1 each 0    propranolol (INDERAL) 10 mg tablet Take 10 mg by mouth 2 (two) times a day      rizatriptan (MAXALT-MLT) 5 mg disintegrating tablet Take 5 mg by mouth      valACYclovir (VALTREX) 1,000 mg tablet Take 1 tablet (1,000 mg total) by mouth 3 (three) times a day for 10 days 30 tablet 0     No current facility-administered medications on file prior to visit.     Social History     Socioeconomic History    Marital status: Single     Spouse name: Not on file    Number of children: Not on file    Years of education: Not on file    Highest education level: Not on file   Occupational History    Not on file   Tobacco Use    Smoking status: Every Day     Current packs/day: 0.25     Types: Cigarettes    Smokeless tobacco: Never   Vaping Use    Vaping status: Never Used   Substance and Sexual Activity    Alcohol use: Not Currently    Drug use: Never    Sexual activity: Not on file   Other Topics Concern    Not on file   Social History Narrative    Not on file     Social Determinants of Health     Financial Resource Strain: Not on file   Food Insecurity: Not on file   Transportation Needs: Not on file   Physical Activity: Not on file   Stress: Not on file   Social Connections: Not on file   Intimate Partner Violence: Not on file   Housing Stability: Not on file         I have reviewed the past medical, surgical, social and family history, medications and allergies as documented in the EMR.    Review of systems: ROS is negative other than that noted in the HPI.  Constitutional: Negative for fatigue and fever.   HENT: Negative for sore throat.    Respiratory: Negative for shortness of breath.   "  Cardiovascular: Negative for chest pain.   Gastrointestinal: Negative for abdominal pain.   Endocrine: Negative for cold intolerance and heat intolerance.   Genitourinary: Negative for flank pain.   Musculoskeletal: Negative for back pain.   Skin: Negative for rash.   Allergic/Immunologic: Negative for immunocompromised state.   Neurological: Negative for dizziness.   Psychiatric/Behavioral: Negative for agitation.      Physical Exam:    Blood pressure 119/87, pulse 97, height 5' 5\" (1.651 m), weight 72.6 kg (160 lb), last menstrual period 03/26/2024, unknown if currently breastfeeding.    General/Constitutional: NAD, well developed, well nourished  HENT: Normocephalic, atraumatic  CV: Intact distal pulses, regular rate  Resp: No respiratory distress or labored breathing  Neuro: Alert and Oriented x 3  Psych: Normal mood, normal affect, normal judgement, normal behavior  Skin: Warm, dry, no rashes, no erythema      Focused right knee exam:  Ambulates with a normal gait.    Skin is intact. No evidence of ecchymosis, erythema, gross deformity. Previous surgical incision noted along the anteromedial knee joint. negative effusion.     Tenderness to palpation over the medial condyle.Palpation of the knee demonstrates no tenderness over the medial patellar facet, lateral patellar facet, tibial tubercle or patellar tendon.  There is tenderness over the pes anserine bursa.  There is no tenderness over Gerdy's tubercle. There is no tenderness in the popliteal fossa.  There is no tenderness over the lateral epicondyle.  There is no tenderness with palpation over the posterior calf without palpable cords.    Range of motion testing demonstrates that the patient is able to achieve 0 degrees of extension and 130 degrees of flexion. There is pain with hyperflexion but not with hyperextension.  There is negative patellar crepitus. The patient is able to do a straight leg raise.      Strength testing demonstrates 5/5 strength " with hip flexion, 5/5 knee extension, 5/5 knee flexion, and 5/5 dorsiflexion and plantarflexion.    Provocation testing demonstrates  Mensicus- + medial joint line tenderness, negative lateral joint line tenderness, negative Loyd's, negative flexion compression.  Collateral ligaments- negative varus laxity at full extension and in 30° of knee flexion, negative valgus laxity at full extension and in 30° of knee flexion.  Cruciate ligament- 1A Lachman examination, negative pivot shift test, 1A anterior drawer, 1A posterior drawer, negative posterior sag.  Patella- negative apprehension with lateral patellar translation (able to sublux 2 quadrant with firm endpoint), negative apprehension with medial patellar translation (able to sublux 2 quadrant with soft endpoint), negative J-sign, negative patellar grind test, negative tight lateral patellar tilt.    Range of motion testing of the hip and ankle are within normal limits.    No calf tenderness to palpation bilaterally    LE NV Exam: +2 DP/PT pulses bilaterally  Sensation intact to light touch L2-S1 bilaterally, SPN/DPN/TA motor intact       Knee Imaging    X-rays of the right knee were obtained on 4/5/2024 and reviewed with the patient. Per my independent review, the imaging shows no evidence of fracture or dislocation.  No significant degenerative changes noted.  There is orthopedic hardware located over the medial epicondyle from prior patella stabilization surgery.          Scribe Attestation      I,:   am acting as a scribe while in the presence of the attending physician.:       I,:   personally performed the services described in this documentation    as scribed in my presence.:

## 2025-03-14 ENCOUNTER — OFFICE VISIT (OUTPATIENT)
Dept: URGENT CARE | Facility: CLINIC | Age: 35
End: 2025-03-14
Payer: COMMERCIAL

## 2025-03-14 VITALS
HEIGHT: 65 IN | SYSTOLIC BLOOD PRESSURE: 123 MMHG | WEIGHT: 171 LBS | OXYGEN SATURATION: 98 % | RESPIRATION RATE: 18 BRPM | DIASTOLIC BLOOD PRESSURE: 76 MMHG | BODY MASS INDEX: 28.49 KG/M2 | TEMPERATURE: 99.1 F | HEART RATE: 93 BPM

## 2025-03-14 DIAGNOSIS — H69.92 DISORDER OF LEFT EUSTACHIAN TUBE: ICD-10-CM

## 2025-03-14 DIAGNOSIS — J01.90 ACUTE SINUSITIS, RECURRENCE NOT SPECIFIED, UNSPECIFIED LOCATION: Primary | ICD-10-CM

## 2025-03-14 PROCEDURE — G0382 LEV 3 HOSP TYPE B ED VISIT: HCPCS | Performed by: PHYSICIAN ASSISTANT

## 2025-03-14 PROCEDURE — S9083 URGENT CARE CENTER GLOBAL: HCPCS | Performed by: PHYSICIAN ASSISTANT

## 2025-03-14 RX ORDER — CETIRIZINE HYDROCHLORIDE, PSEUDOEPHEDRINE HYDROCHLORIDE 5; 120 MG/1; MG/1
1 TABLET, FILM COATED, EXTENDED RELEASE ORAL 2 TIMES DAILY
COMMUNITY
Start: 2024-09-04 | End: 2025-09-04

## 2025-03-14 RX ORDER — RIZATRIPTAN BENZOATE 5 MG/1
TABLET ORAL
COMMUNITY
Start: 2025-01-24

## 2025-03-14 RX ORDER — AMOXICILLIN 875 MG/1
875 TABLET, COATED ORAL 2 TIMES DAILY
Qty: 20 TABLET | Refills: 0 | Status: SHIPPED | OUTPATIENT
Start: 2025-03-14 | End: 2025-03-24

## 2025-03-14 RX ORDER — PREDNISONE 10 MG/1
TABLET ORAL
Qty: 26 TABLET | Refills: 0 | Status: SHIPPED | OUTPATIENT
Start: 2025-03-14

## 2025-03-14 NOTE — PROGRESS NOTES
[facility]    NAME: Yary Bocanegra is a 34 y.o. female  : 1990    MRN: [id number]  DATE: 2025  TIME: 7:16 PM    Assessment and Plan   Acute sinusitis, recurrence not specified, unspecified location [J01.90]  1. Acute sinusitis, recurrence not specified, unspecified location  amoxicillin (AMOXIL) 875 mg tablet    predniSONE 10 mg tablet      2. Disorder of left eustachian tube            Patient Instructions     Patient Instructions   I have prescribed an antibiotic for the infection.  Please take the antibiotic as prescribed and finish the entire prescription.  Take an over the counter probiotic or eat yogurt with live cultures in it (activia) to keep good bacteria in the gut and help prevent diarrhea.  Wash hands frequently to prevent the spread of infection.  Can use over the counter cough and cold medications to help with symptoms.  Ibuprofen and/or tylenol as needed for pain or fever.  If not improving over the next 7-10 days, follow up with PCP.          Chief Complaint     Chief Complaint   Patient presents with    Earache     Left sided ear pain/clogged for 3 days without fever       History of Present Illness   Yary is a 34 year old female presenting with left ear pain beginning 3 days ago. She states her ear feels clogged and itchy. She mentions she has associated dizziness and feels off-balanced. She did not try anything OTC for her ear pain but admits to using a Q-tip to itch in the ear canal.     She adds she has been experiencing cold-like symptoms x 2 weeks. She states she is experiencing a sore throat, rhinorrhea, and post nasal drip. She denies fevers, chills, cough, CP, SOB, N/V/D. She did not take anything OTC for her cold symptoms. She states she has no sick contacts.         Review of Systems   Review of Systems   Constitutional:  Negative for chills, fatigue and fever.   HENT:  Positive for ear pain (left), postnasal drip, rhinorrhea and sore throat. Negative for  ear discharge, hearing loss, sinus pain and trouble swallowing.    Eyes:  Negative for pain and itching.   Respiratory:  Negative for cough and shortness of breath.    Cardiovascular:  Negative for chest pain and palpitations.   Gastrointestinal:  Negative for abdominal pain, diarrhea, nausea and vomiting.   Musculoskeletal:  Negative for myalgias.   Neurological:  Positive for dizziness. Negative for headaches.       Current Medications     Current Outpatient Medications:     amoxicillin (AMOXIL) 875 mg tablet, Take 1 tablet (875 mg total) by mouth 2 (two) times a day for 10 days, Disp: 20 tablet, Rfl: 0    cetirizine-pseudoephedrine (ZyrTEC-D) 5-120 MG per tablet, Take 1 tablet by mouth 2 (two) times a day, Disp: , Rfl:     Fexofenadine-Pseudoephedrine (ALLEGRA-D 24 HOUR PO), Take 1 tablet by mouth in the morning, Disp: , Rfl:     Levonorgestrel (Mirena, 52 MG,) 20 MCG/DAY IUD, 1 Intra Uterine Device by Intrauterine route continuous, Disp: , Rfl:     predniSONE 10 mg tablet, Take 3 tabs BID X 2 days, 2 tabs BID X 2 days, 1 tab BID X 2 days, 1 tab daily X 2 days, Disp: 26 tablet, Rfl: 0    rizatriptan (MAXALT) 5 mg tablet, PLEASE SEE ATTACHED FOR DETAILED DIRECTIONS, Disp: , Rfl:     benzonatate (TESSALON) 200 MG capsule, Take 1 capsule (200 mg total) by mouth 3 (three) times a day as needed for cough (Patient not taking: Reported on 3/14/2025), Disp: 20 capsule, Rfl: 0    gabapentin (Neurontin) 100 mg capsule, Take 1 capsule (100 mg total) by mouth 3 (three) times a day (Patient not taking: Reported on 3/14/2025), Disp: 30 capsule, Rfl: 0    methylPREDNISolone 4 MG tablet therapy pack, Use as directed on package (Patient not taking: Reported on 3/14/2025), Disp: 1 each, Rfl: 0    predniSONE 10 mg tablet, Take 3 tabs BID X 2 days, 2 tabs BID X 2 days, 1 tab BID X 2 days, 1 tab daily X 2 days (Patient not taking: Reported on 3/14/2025), Disp: 26 tablet, Rfl: 0    propranolol (INDERAL) 10 mg tablet, Take 10 mg by  mouth 2 (two) times a day, Disp: , Rfl:     valACYclovir (VALTREX) 1,000 mg tablet, Take 1 tablet (1,000 mg total) by mouth 3 (three) times a day for 10 days, Disp: 30 tablet, Rfl: 0    Current Allergies     Allergies as of 03/14/2025 - Reviewed 03/14/2025   Allergen Reaction Noted    Azithromycin Hives 07/03/2018    Guaifenesin er Tachycardia 03/14/2025    Fexofenadine Palpitations and Tachycardia 07/03/2018    Guaifenesin Palpitations 03/04/2020    Iodinated contrast media Rash and Other (See Comments) 03/04/2020    Latex Rash and Other (See Comments) 07/03/2018          The following portions of the patient's history were reviewed and updated as appropriate: allergies, current medications, past family history, past medical history, past social history, past surgical history and problem list.   Past Medical History:   Diagnosis Date    Anxiety     Depression      Past Surgical History:   Procedure Laterality Date    KNEE SURGERY       History reviewed. No pertinent family history.  Social History     Socioeconomic History    Marital status: Single     Spouse name: Not on file    Number of children: Not on file    Years of education: Not on file    Highest education level: Not on file   Occupational History    Not on file   Tobacco Use    Smoking status: Every Day     Current packs/day: 0.25     Types: Cigarettes    Smokeless tobacco: Never   Vaping Use    Vaping status: Never Used   Substance and Sexual Activity    Alcohol use: Not Currently    Drug use: Never    Sexual activity: Not on file   Other Topics Concern    Not on file   Social History Narrative    Not on file     Social Drivers of Health     Financial Resource Strain: Low Risk  (8/28/2024)    Received from [facility]    Overall Financial Resource Strain (CARDIA)     Difficulty of Paying Living Expenses: Not very hard   Food Insecurity: No Food Insecurity (8/28/2024)    Received from [facility]    Hunger Vital Sign      "Worried About Running Out of Food in the Last Year: Never true     Ran Out of Food in the Last Year: Never true   Transportation Needs: No Transportation Needs (8/28/2024)    Received from [facility]    PRAPARE - Transportation     Lack of Transportation (Medical): No     Lack of Transportation (Non-Medical): No   Physical Activity: Not on file   Stress: Not on file   Social Connections: Feeling Socially Integrated (8/28/2024)    Received from [facility]    OASIS : Social Isolation     How often do you feel lonely or isolated from those around you?: Rarely   Intimate Partner Violence: Not At Risk (8/28/2024)    Received from [facility]    Humiliation, Afraid, Rape, and Kick questionnaire     Fear of Current or Ex-Partner: No     Emotionally Abused: No     Physically Abused: No     Sexually Abused: No   Housing Stability: Low Risk  (8/28/2024)    Received from [facility]    Housing Stability Vital Sign     Unable to Pay for Housing in the Last Year: No     Number of Times Moved in the Last Year: 0     Homeless in the Last Year: No     Medications have been verified.    Objective   /76   Pulse 93   Temp 99.1 °F (37.3 °C)   Resp 18   Ht 5' 5\" (1.651 m)   Wt 77.6 kg (171 lb)   SpO2 98%   BMI 28.46 kg/m²      Physical Exam   Physical Exam  Constitutional:       General: She is not in acute distress.     Appearance: Normal appearance. She is not ill-appearing.   HENT:      Head: Normocephalic and atraumatic.      Right Ear: Tympanic membrane, ear canal and external ear normal.      Left Ear: Tympanic membrane, ear canal and external ear normal. No swelling or tenderness. Tympanic membrane is not erythematous or bulging.      Nose: Nose normal. No congestion or rhinorrhea.      Mouth/Throat:      Mouth: Mucous membranes are moist.      Pharynx: Pharyngeal swelling, posterior oropharyngeal erythema and postnasal drip present. No " oropharyngeal exudate or uvula swelling.      Tonsils: 2+ on the right. 3+ on the left.   Eyes:      Pupils: Pupils are equal, round, and reactive to light.   Cardiovascular:      Rate and Rhythm: Normal rate and regular rhythm.      Heart sounds: Normal heart sounds. No murmur heard.     No friction rub. No gallop.   Pulmonary:      Effort: Pulmonary effort is normal.      Breath sounds: Normal breath sounds. No wheezing, rhonchi or rales.   Skin:     General: Skin is warm and dry.   Neurological:      Mental Status: She is alert.

## 2025-06-15 ENCOUNTER — OFFICE VISIT (OUTPATIENT)
Dept: URGENT CARE | Facility: CLINIC | Age: 35
End: 2025-06-15
Payer: COMMERCIAL

## 2025-06-15 ENCOUNTER — APPOINTMENT (OUTPATIENT)
Dept: RADIOLOGY | Facility: CLINIC | Age: 35
End: 2025-06-15
Payer: COMMERCIAL

## 2025-06-15 VITALS
OXYGEN SATURATION: 98 % | RESPIRATION RATE: 18 BRPM | DIASTOLIC BLOOD PRESSURE: 72 MMHG | SYSTOLIC BLOOD PRESSURE: 120 MMHG | HEART RATE: 88 BPM

## 2025-06-15 DIAGNOSIS — J02.9 SORE THROAT: ICD-10-CM

## 2025-06-15 DIAGNOSIS — R07.89 CHEST PRESSURE: ICD-10-CM

## 2025-06-15 DIAGNOSIS — J32.9 SINOBRONCHITIS: Primary | ICD-10-CM

## 2025-06-15 DIAGNOSIS — J40 SINOBRONCHITIS: Primary | ICD-10-CM

## 2025-06-15 LAB — S PYO AG THROAT QL: NEGATIVE

## 2025-06-15 PROCEDURE — S9083 URGENT CARE CENTER GLOBAL: HCPCS

## 2025-06-15 PROCEDURE — G0383 LEV 4 HOSP TYPE B ED VISIT: HCPCS

## 2025-06-15 PROCEDURE — 93005 ELECTROCARDIOGRAM TRACING: CPT

## 2025-06-15 PROCEDURE — 87880 STREP A ASSAY W/OPTIC: CPT

## 2025-06-15 PROCEDURE — 71046 X-RAY EXAM CHEST 2 VIEWS: CPT

## 2025-06-15 PROCEDURE — 87070 CULTURE OTHR SPECIMN AEROBIC: CPT

## 2025-06-15 RX ORDER — BENZONATATE 100 MG/1
100 CAPSULE ORAL 3 TIMES DAILY PRN
Qty: 20 CAPSULE | Refills: 0 | Status: SHIPPED | OUTPATIENT
Start: 2025-06-15

## 2025-06-15 RX ORDER — PREDNISONE 10 MG/1
TABLET ORAL
Qty: 26 TABLET | Refills: 0 | Status: SHIPPED | OUTPATIENT
Start: 2025-06-15

## 2025-06-15 RX ORDER — ALBUTEROL SULFATE 90 UG/1
2 INHALANT RESPIRATORY (INHALATION) EVERY 6 HOURS PRN
Qty: 8.5 G | Refills: 0 | Status: SHIPPED | OUTPATIENT
Start: 2025-06-15

## 2025-06-15 NOTE — PATIENT INSTRUCTIONS
Please take Augmentin twice daily for the next 7 days.     Please start a Prednisone taper daily as directed.  Please take steroids with food and do not take any Ibuprofen or other NSAID containing medications as this may increase the risk for GI bleeding. You may take Tylenol if needed.      You may take Tessalon three times daily as needed for cough.     You may use Albuterol inhaler every 6 hours as needed for cough, wheezing, shortness of breath or chest tightness.      While sick, make sure you get lots of rest and increase your fluid intake.   You may use OTC nasal saline spray, Flonase, and Mucinex for mild congestion.   Take Tylenol for fever, mild pain, and/or body aches.  Perform salt water gargles, drink warm tea with honey, and use throat lozenges and Chloraseptic spray for sore throat.    You can also apply warm compresses over your sinuses for any sinus pressure.     While you are sick, taking vitamins may help boost your immune system and potentially aid in recovery by supporting your body's natural defense mechanisms: Vitamin D3 2000 IU daily, Vitamin C 1000mg daily , and Multivitamin daily.    If your symptoms do not improve with our current treatment plan or worsen, please schedule an appointment with your PCP. If you develop any high or persistent fevers, chest pain, palpitations, shortness of breath, difficulty swallowing, decreased urine output, abdominal pain, dizziness or any other concerning symptoms, please proceed to the ED.

## 2025-06-15 NOTE — PROGRESS NOTES
[facility]        NAME: Yary Bocanegra is a 35 y.o. female  : 1990    MRN: [id number]  DATE: Tara 15, 2025  TIME: 6:01 PM    Assessment and Plan   Sinobronchitis [J32.9, J40]  1. Sinobronchitis  amoxicillin-clavulanate (AUGMENTIN) 875-125 mg per tablet    predniSONE 10 mg tablet    albuterol (ProAir HFA) 90 mcg/act inhaler    benzonatate (TESSALON PERLES) 100 mg capsule      2. Sore throat  Throat culture    POCT rapid ANTIGEN strepA    Throat culture      3. Chest pressure  ECG 12 lead    XR chest pa and lateral        Rapid strep in office negative.  Will send throat culture.  EKG performed in office given chest pressure.  EKG normal sinus rhythm.  Rate 90.  Normal intervals.  No significant ST changes, elevations or depressions. EKG pending final read. Chest XR also obtained in office. XR imaging reviewed and negative for any acute cardiopulmonary abnormality or consolidation. Pending final read from radiology.  Will treat with Augmentin and short course of steroids.  Will also start on Tessalon and albuterol as needed for symptoms.  Recommend supportive care with OTC Tylenol, Flonase and Mucinex.  Instructed patient to rest and increase fluid intake. Instructed patient to follow-up with PCP for no improvement or worsening of symptoms.  Patient educated on red flag symptoms and when to proceed to the ED.  Patient agreeable and understands current treatment plan.     Patient Instructions     Patient Instructions   Please take Augmentin twice daily for the next 7 days.     Please start a Prednisone taper daily as directed.  Please take steroids with food and do not take any Ibuprofen or other NSAID containing medications as this may increase the risk for GI bleeding. You may take Tylenol if needed.      You may take Tessalon three times daily as needed for cough.     You may use Albuterol inhaler every 6 hours as needed for cough, wheezing, shortness of breath or chest tightness.      While  sick, make sure you get lots of rest and increase your fluid intake.   You may use OTC nasal saline spray, Flonase, and Mucinex for mild congestion.   Take Tylenol for fever, mild pain, and/or body aches.  Perform salt water gargles, drink warm tea with honey, and use throat lozenges and Chloraseptic spray for sore throat.    You can also apply warm compresses over your sinuses for any sinus pressure.     While you are sick, taking vitamins may help boost your immune system and potentially aid in recovery by supporting your body's natural defense mechanisms: Vitamin D3 2000 IU daily, Vitamin C 1000mg daily , and Multivitamin daily.    If your symptoms do not improve with our current treatment plan or worsen, please schedule an appointment with your PCP. If you develop any high or persistent fevers, chest pain, palpitations, shortness of breath, difficulty swallowing, decreased urine output, abdominal pain, dizziness or any other concerning symptoms, please proceed to the ED.       Follow up with PCP in 3-5 days.  Proceed to  ER if symptoms worsen.    Chief Complaint     Chief Complaint   Patient presents with   • Cold Like Symptoms     Nasal congestion chest congestion 3 days          History of Present Illness       35-year-old female presents to the clinic for evaluation of sinus congestion x 10 days.  Patient reports her sinus congestion did improve however returned a few days ago and is accompanied by associated symptoms including chills, runny nose, sore throat, pain when swallowing and occasional cough.  Patient does report this morning she had started having chest pressure.  She reports it feels like someone is hugging her and her chest is tight.  She rates the pressure a 4 out of 10 and denies any radiation of pain.  She denies any other symptoms including fevers, shortness of breath, wheezing, palpitations, nausea, vomiting, diarrhea, body aches, dizziness or headaches.  Patient reports her symptoms have  been gradually worsening.  She denies taking any OTC medications for symptoms.            Review of Systems   Review of Systems   Constitutional:  Positive for chills. Negative for fever.   HENT:  Positive for congestion, rhinorrhea, sore throat and trouble swallowing (painful to swallow).    Respiratory:  Positive for cough (occasional). Negative for shortness of breath and wheezing.    Cardiovascular:  Positive for chest pain (pressure). Negative for palpitations.   Gastrointestinal:  Negative for diarrhea, nausea and vomiting.   Musculoskeletal:  Negative for arthralgias and myalgias.   Neurological:  Negative for dizziness and headaches.   All other systems reviewed and are negative.        Current Medications     Current Medications[1]    Current Allergies     Allergies as of 06/15/2025 - Reviewed 06/15/2025   Allergen Reaction Noted   • Azithromycin Hives 07/03/2018   • Guaifenesin er Tachycardia 03/14/2025   • Fexofenadine Palpitations and Tachycardia 07/03/2018   • Guaifenesin Palpitations 03/04/2020   • Iodinated contrast media Rash and Other (See Comments) 03/04/2020   • Latex Rash and Other (See Comments) 07/03/2018            The following portions of the patient's history were reviewed and updated as appropriate: allergies, current medications, past family history, past medical history, past social history, past surgical history and problem list.     Past Medical History[2]    Past Surgical History[3]    Family History[4]      Medications have been verified.        Objective   /72   Pulse 88   Resp 18   SpO2 98%        Physical Exam     Physical Exam  Vitals and nursing note reviewed.   Constitutional:       Appearance: Normal appearance.   HENT:      Head: Normocephalic and atraumatic.      Right Ear: Tympanic membrane, ear canal and external ear normal.      Left Ear: Tympanic membrane, ear canal and external ear normal.      Nose: Congestion and rhinorrhea present.      Right Sinus: No  maxillary sinus tenderness or frontal sinus tenderness.      Left Sinus: No maxillary sinus tenderness or frontal sinus tenderness.      Mouth/Throat:      Mouth: Mucous membranes are moist.      Pharynx: Oropharynx is clear. Uvula midline. Posterior oropharyngeal erythema and postnasal drip present. No pharyngeal swelling, oropharyngeal exudate or uvula swelling.     Cardiovascular:      Rate and Rhythm: Normal rate and regular rhythm.      Heart sounds: Normal heart sounds. No murmur heard.     No friction rub. No gallop.   Pulmonary:      Effort: Pulmonary effort is normal.      Breath sounds: Normal breath sounds. No stridor. No wheezing, rhonchi or rales.   Abdominal:      General: There is no distension.      Palpations: Abdomen is soft.      Tenderness: There is no abdominal tenderness.   Lymphadenopathy:      Cervical: No cervical adenopathy.     Skin:     General: Skin is warm and dry.     Neurological:      General: No focal deficit present.      Mental Status: She is alert and oriented to person, place, and time.     Psychiatric:         Mood and Affect: Mood normal.         Behavior: Behavior normal.                          [1]    Current Outpatient Medications:   •  albuterol (ProAir HFA) 90 mcg/act inhaler, Inhale 2 puffs every 6 (six) hours as needed for wheezing, Disp: 8.5 g, Rfl: 0  •  amoxicillin-clavulanate (AUGMENTIN) 875-125 mg per tablet, Take 1 tablet by mouth every 12 (twelve) hours for 7 days, Disp: 14 tablet, Rfl: 0  •  benzonatate (TESSALON PERLES) 100 mg capsule, Take 1 capsule (100 mg total) by mouth 3 (three) times a day as needed for cough, Disp: 20 capsule, Rfl: 0  •  cetirizine-pseudoephedrine (ZyrTEC-D) 5-120 MG per tablet, Take 1 tablet by mouth in the morning and 1 tablet in the evening., Disp: , Rfl:   •  predniSONE 10 mg tablet, Take 3 tablets BID x 2 days, Take 2 tablets BID x 2 days, Take 1 tablet BID x 2 days, Take 1 tablet daily x 2 days, Disp: 26 tablet, Rfl: 0  •   propranolol (INDERAL) 10 mg tablet, Take 10 mg by mouth in the morning and 10 mg in the evening., Disp: , Rfl:   •  rizatriptan (MAXALT) 5 mg tablet, , Disp: , Rfl:   •  benzonatate (TESSALON) 200 MG capsule, Take 1 capsule (200 mg total) by mouth 3 (three) times a day as needed for cough (Patient not taking: Reported on 3/14/2025), Disp: 20 capsule, Rfl: 0  •  Fexofenadine-Pseudoephedrine (ALLEGRA-D 24 HOUR PO), Take 1 tablet by mouth in the morning (Patient not taking: Reported on 6/15/2025), Disp: , Rfl:   •  gabapentin (Neurontin) 100 mg capsule, Take 1 capsule (100 mg total) by mouth 3 (three) times a day (Patient not taking: Reported on 3/14/2025), Disp: 30 capsule, Rfl: 0  •  Levonorgestrel (Mirena, 52 MG,) 20 MCG/DAY IUD, 1 Intra Uterine Device by Intrauterine route continuous, Disp: , Rfl:   •  methylPREDNISolone 4 MG tablet therapy pack, Use as directed on package (Patient not taking: Reported on 3/14/2025), Disp: 1 each, Rfl: 0  •  predniSONE 10 mg tablet, Take 3 tabs BID X 2 days, 2 tabs BID X 2 days, 1 tab BID X 2 days, 1 tab daily X 2 days (Patient not taking: Reported on 3/14/2025), Disp: 26 tablet, Rfl: 0  •  predniSONE 10 mg tablet, Take 3 tabs BID X 2 days, 2 tabs BID X 2 days, 1 tab BID X 2 days, 1 tab daily X 2 days (Patient not taking: Reported on 6/15/2025), Disp: 26 tablet, Rfl: 0  •  valACYclovir (VALTREX) 1,000 mg tablet, Take 1 tablet (1,000 mg total) by mouth 3 (three) times a day for 10 days, Disp: 30 tablet, Rfl: 0[2]  Past Medical History:  Diagnosis Date   • Anxiety    • Depression    [3]  Past Surgical History:  Procedure Laterality Date   • KNEE SURGERY     [4]  No family history on file.

## 2025-06-17 LAB — BACTERIA THROAT CULT: NORMAL

## 2025-06-19 LAB
ATRIAL RATE: 90 BPM
P AXIS: 51 DEGREES
PR INTERVAL: 140 MS
QRS AXIS: 72 DEGREES
QRSD INTERVAL: 80 MS
QT INTERVAL: 364 MS
QTC INTERVAL: 446 MS
T WAVE AXIS: 57 DEGREES
VENTRICULAR RATE: 90 BPM

## 2025-06-19 PROCEDURE — 93010 ELECTROCARDIOGRAM REPORT: CPT | Performed by: INTERNAL MEDICINE

## 2025-07-09 ENCOUNTER — OFFICE VISIT (OUTPATIENT)
Dept: URGENT CARE | Facility: CLINIC | Age: 35
End: 2025-07-09
Payer: COMMERCIAL

## 2025-07-09 VITALS
TEMPERATURE: 98.1 F | RESPIRATION RATE: 18 BRPM | HEART RATE: 81 BPM | OXYGEN SATURATION: 97 % | DIASTOLIC BLOOD PRESSURE: 78 MMHG | SYSTOLIC BLOOD PRESSURE: 112 MMHG

## 2025-07-09 DIAGNOSIS — R42 VERTIGO: Primary | ICD-10-CM

## 2025-07-09 LAB
ATRIAL RATE: 68 BPM
ATRIAL RATE: 68 BPM
GLUCOSE SERPL-MCNC: 116 MG/DL (ref 65–140)
P AXIS: 45 DEGREES
P AXIS: 45 DEGREES
PR INTERVAL: 146 MS
PR INTERVAL: 146 MS
QRS AXIS: 70 DEGREES
QRS AXIS: 70 DEGREES
QRSD INTERVAL: 82 MS
QRSD INTERVAL: 82 MS
QT INTERVAL: 398 MS
QT INTERVAL: 398 MS
QTC INTERVAL: 424 MS
QTC INTERVAL: 424 MS
T WAVE AXIS: 51 DEGREES
T WAVE AXIS: 51 DEGREES
VENTRICULAR RATE: 68 BPM
VENTRICULAR RATE: 68 BPM

## 2025-07-09 PROCEDURE — S9083 URGENT CARE CENTER GLOBAL: HCPCS | Performed by: NURSE PRACTITIONER

## 2025-07-09 PROCEDURE — 82948 REAGENT STRIP/BLOOD GLUCOSE: CPT | Performed by: NURSE PRACTITIONER

## 2025-07-09 PROCEDURE — 93005 ELECTROCARDIOGRAM TRACING: CPT | Performed by: NURSE PRACTITIONER

## 2025-07-09 PROCEDURE — G0383 LEV 4 HOSP TYPE B ED VISIT: HCPCS | Performed by: NURSE PRACTITIONER

## 2025-07-09 PROCEDURE — 93010 ELECTROCARDIOGRAM REPORT: CPT | Performed by: INTERNAL MEDICINE

## 2025-07-09 RX ORDER — MECLIZINE HYDROCHLORIDE 25 MG/1
25 TABLET ORAL 3 TIMES DAILY PRN
Qty: 30 TABLET | Refills: 0 | Status: SHIPPED | OUTPATIENT
Start: 2025-07-09

## 2025-07-09 NOTE — PROGRESS NOTES
[facility]        NAME: Yary Bocanegra is a 35 y.o. female  : 1990    MRN: [id number]  DATE: 2025  TIME: 8:47 AM    Assessment and Plan   Vertigo [R42]  1. Vertigo  meclizine (ANTIVERT) 25 mg tablet            Patient Instructions       At the clinic, RBS is 116 mg/dL  EKG is normal sinus rhythm   Likely vertigo; take med as prescribed  If no improvement, f/u with ENT  Follow up with PCP in 3-5 days.  Proceed to  ER if symptoms worsen.    If tests have been performed at [facility], our office will contact you with results if changes need to be made to the care plan discussed with you at the visit.  You can review your full results on [facility].    Chief Complaint     Chief Complaint   Patient presents with    Dizziness     Dizziness lightheaded nausea less than 24 hours denies injury          History of Present Illness       HPI  Presents to clinic with complaint of lightheadedness which started last night. [place] like the room was spinning. This was around 10 to 10:30 PM. Lasted until about 1145 when she went to bed. Woke up this morning with same dizziness but at a decreased intensity. Last night was 10/10. This morning was 6/10. Ongoing. A bit of nausea but no vomiting. Denies head injury. No syncope. No previous occurrences. No ear symptoms    Review of Systems   Review of Systems   HENT:  Negative for ear discharge and ear pain.    Eyes:  Negative for visual disturbance.   Respiratory:  Negative for shortness of breath.    Cardiovascular:  Negative for chest pain and palpitations.   Gastrointestinal:  Positive for nausea. Negative for abdominal pain, diarrhea and vomiting.   Neurological:  Positive for light-headedness. Negative for tremors, seizures, syncope, numbness and headaches.         Current Medications     Current Medications[1]    Current Allergies     Allergies as of 2025 - Reviewed 2025   Allergen Reaction Noted    Azithromycin Hives 2018     Guaifenesin er Tachycardia 03/14/2025    Fexofenadine Palpitations and Tachycardia 07/03/2018    Guaifenesin Palpitations 03/04/2020    Iodinated contrast media Rash and Other (See Comments) 03/04/2020    Latex Rash and Other (See Comments) 07/03/2018            The following portions of the patient's history were reviewed and updated as appropriate: allergies, current medications, past family history, past medical history, past social history, past surgical history and problem list.     Past Medical History[2]    Past Surgical History[3]    Family History[4]      Medications have been verified.        Objective   /78   Pulse 81   Temp 98.1 °F (36.7 °C)   Resp 18   SpO2 97%   No LMP recorded.       Physical Exam     Physical Exam  Constitutional:       General: She is not in acute distress.     Appearance: She is not ill-appearing.   HENT:      Head: Normocephalic and atraumatic.      Comments: No facial muscle weakness    Eyes:      Extraocular Movements: Extraocular movements intact.      Pupils: Pupils are equal, round, and reactive to light.      Comments: Normal vision     Cardiovascular:      Rate and Rhythm: Regular rhythm.      Heart sounds: Normal heart sounds.   Pulmonary:      Effort: Pulmonary effort is normal.      Breath sounds: Normal breath sounds.     Neurological:      General: No focal deficit present.      Deep Tendon Reflexes: Reflexes normal.                        [1]   Current Outpatient Medications:     meclizine (ANTIVERT) 25 mg tablet, Take 1 tablet (25 mg total) by mouth 3 (three) times a day as needed for dizziness, Disp: 30 tablet, Rfl: 0    albuterol (ProAir HFA) 90 mcg/act inhaler, Inhale 2 puffs every 6 (six) hours as needed for wheezing, Disp: 8.5 g, Rfl: 0    benzonatate (TESSALON PERLES) 100 mg capsule, Take 1 capsule (100 mg total) by mouth 3 (three) times a day as needed for cough, Disp: 20 capsule, Rfl: 0    benzonatate (TESSALON) 200 MG capsule, Take 1 capsule (200  mg total) by mouth 3 (three) times a day as needed for cough (Patient not taking: Reported on 3/14/2025), Disp: 20 capsule, Rfl: 0    cetirizine-pseudoephedrine (ZyrTEC-D) 5-120 MG per tablet, Take 1 tablet by mouth in the morning and 1 tablet in the evening., Disp: , Rfl:     Fexofenadine-Pseudoephedrine (ALLEGRA-D 24 HOUR PO), Take 1 tablet by mouth in the morning (Patient not taking: Reported on 6/15/2025), Disp: , Rfl:     gabapentin (Neurontin) 100 mg capsule, Take 1 capsule (100 mg total) by mouth 3 (three) times a day (Patient not taking: Reported on 3/14/2025), Disp: 30 capsule, Rfl: 0    Levonorgestrel (Mirena, 52 MG,) 20 MCG/DAY IUD, 1 Intra Uterine Device by Intrauterine route continuous, Disp: , Rfl:     methylPREDNISolone 4 MG tablet therapy pack, Use as directed on package (Patient not taking: Reported on 3/14/2025), Disp: 1 each, Rfl: 0    predniSONE 10 mg tablet, Take 3 tabs BID X 2 days, 2 tabs BID X 2 days, 1 tab BID X 2 days, 1 tab daily X 2 days (Patient not taking: Reported on 3/14/2025), Disp: 26 tablet, Rfl: 0    predniSONE 10 mg tablet, Take 3 tabs BID X 2 days, 2 tabs BID X 2 days, 1 tab BID X 2 days, 1 tab daily X 2 days (Patient not taking: Reported on 6/15/2025), Disp: 26 tablet, Rfl: 0    predniSONE 10 mg tablet, Take 3 tablets BID x 2 days, Take 2 tablets BID x 2 days, Take 1 tablet BID x 2 days, Take 1 tablet daily x 2 days, Disp: 26 tablet, Rfl: 0    propranolol (INDERAL) 10 mg tablet, Take 10 mg by mouth in the morning and 10 mg in the evening., Disp: , Rfl:     rizatriptan (MAXALT) 5 mg tablet, , Disp: , Rfl:     valACYclovir (VALTREX) 1,000 mg tablet, Take 1 tablet (1,000 mg total) by mouth 3 (three) times a day for 10 days, Disp: 30 tablet, Rfl: 0  [2]   Past Medical History:  Diagnosis Date    Anxiety     Depression    [3]   Past Surgical History:  Procedure Laterality Date    KNEE SURGERY     [4] No family history on file.

## 2025-08-10 ENCOUNTER — OFFICE VISIT (OUTPATIENT)
Dept: URGENT CARE | Facility: CLINIC | Age: 35
End: 2025-08-10
Payer: COMMERCIAL